# Patient Record
Sex: MALE | Race: OTHER | HISPANIC OR LATINO | URBAN - METROPOLITAN AREA
[De-identification: names, ages, dates, MRNs, and addresses within clinical notes are randomized per-mention and may not be internally consistent; named-entity substitution may affect disease eponyms.]

---

## 2019-07-11 ENCOUNTER — INPATIENT (INPATIENT)
Facility: HOSPITAL | Age: 34
LOS: 11 days | Discharge: HOME HEALTH | DRG: 329 | End: 2019-07-23
Attending: SPECIALIST | Admitting: SPECIALIST
Payer: MEDICAID

## 2019-07-11 ENCOUNTER — TRANSCRIPTION ENCOUNTER (OUTPATIENT)
Age: 34
End: 2019-07-11

## 2019-07-11 VITALS
HEART RATE: 71 BPM | DIASTOLIC BLOOD PRESSURE: 78 MMHG | TEMPERATURE: 98 F | HEIGHT: 65 IN | WEIGHT: 179.9 LBS | RESPIRATION RATE: 16 BRPM | OXYGEN SATURATION: 98 % | SYSTOLIC BLOOD PRESSURE: 114 MMHG

## 2019-07-11 LAB
ALBUMIN SERPL ELPH-MCNC: 4.1 G/DL — SIGNIFICANT CHANGE UP (ref 3.5–5)
ALP SERPL-CCNC: 86 U/L — SIGNIFICANT CHANGE UP (ref 40–120)
ALT FLD-CCNC: 29 U/L DA — SIGNIFICANT CHANGE UP (ref 10–60)
ANION GAP SERPL CALC-SCNC: 9 MMOL/L — SIGNIFICANT CHANGE UP (ref 5–17)
AST SERPL-CCNC: 18 U/L — SIGNIFICANT CHANGE UP (ref 10–40)
BASOPHILS # BLD AUTO: 0.04 K/UL — SIGNIFICANT CHANGE UP (ref 0–0.2)
BASOPHILS NFR BLD AUTO: 0.4 % — SIGNIFICANT CHANGE UP (ref 0–2)
BILIRUB SERPL-MCNC: 1.1 MG/DL — SIGNIFICANT CHANGE UP (ref 0.2–1.2)
BUN SERPL-MCNC: 8 MG/DL — SIGNIFICANT CHANGE UP (ref 7–18)
CALCIUM SERPL-MCNC: 8.4 MG/DL — SIGNIFICANT CHANGE UP (ref 8.4–10.5)
CHLORIDE SERPL-SCNC: 107 MMOL/L — SIGNIFICANT CHANGE UP (ref 96–108)
CO2 SERPL-SCNC: 25 MMOL/L — SIGNIFICANT CHANGE UP (ref 22–31)
CREAT SERPL-MCNC: 1.05 MG/DL — SIGNIFICANT CHANGE UP (ref 0.5–1.3)
EOSINOPHIL # BLD AUTO: 0.03 K/UL — SIGNIFICANT CHANGE UP (ref 0–0.5)
EOSINOPHIL NFR BLD AUTO: 0.3 % — SIGNIFICANT CHANGE UP (ref 0–6)
ETHANOL SERPL-MCNC: <3 MG/DL — SIGNIFICANT CHANGE UP (ref 0–10)
GLUCOSE SERPL-MCNC: 128 MG/DL — HIGH (ref 70–99)
HCT VFR BLD CALC: 50.1 % — HIGH (ref 39–50)
HGB BLD-MCNC: 17 G/DL — SIGNIFICANT CHANGE UP (ref 13–17)
IMM GRANULOCYTES NFR BLD AUTO: 0.3 % — SIGNIFICANT CHANGE UP (ref 0–1.5)
LIDOCAIN IGE QN: 39 U/L — LOW (ref 73–393)
LYMPHOCYTES # BLD AUTO: 1.68 K/UL — SIGNIFICANT CHANGE UP (ref 1–3.3)
LYMPHOCYTES # BLD AUTO: 14.9 % — SIGNIFICANT CHANGE UP (ref 13–44)
MCHC RBC-ENTMCNC: 33.1 PG — SIGNIFICANT CHANGE UP (ref 27–34)
MCHC RBC-ENTMCNC: 33.9 GM/DL — SIGNIFICANT CHANGE UP (ref 32–36)
MCV RBC AUTO: 97.5 FL — SIGNIFICANT CHANGE UP (ref 80–100)
MONOCYTES # BLD AUTO: 0.93 K/UL — HIGH (ref 0–0.9)
MONOCYTES NFR BLD AUTO: 8.3 % — SIGNIFICANT CHANGE UP (ref 2–14)
NEUTROPHILS # BLD AUTO: 8.54 K/UL — HIGH (ref 1.8–7.4)
NEUTROPHILS NFR BLD AUTO: 75.8 % — SIGNIFICANT CHANGE UP (ref 43–77)
NRBC # BLD: 0 /100 WBCS — SIGNIFICANT CHANGE UP (ref 0–0)
PLATELET # BLD AUTO: 289 K/UL — SIGNIFICANT CHANGE UP (ref 150–400)
POTASSIUM SERPL-MCNC: 3.2 MMOL/L — LOW (ref 3.5–5.3)
POTASSIUM SERPL-SCNC: 3.2 MMOL/L — LOW (ref 3.5–5.3)
PROT SERPL-MCNC: 8.4 G/DL — HIGH (ref 6–8.3)
RBC # BLD: 5.14 M/UL — SIGNIFICANT CHANGE UP (ref 4.2–5.8)
RBC # FLD: 11.9 % — SIGNIFICANT CHANGE UP (ref 10.3–14.5)
SODIUM SERPL-SCNC: 141 MMOL/L — SIGNIFICANT CHANGE UP (ref 135–145)
WBC # BLD: 11.25 K/UL — HIGH (ref 3.8–10.5)
WBC # FLD AUTO: 11.25 K/UL — HIGH (ref 3.8–10.5)

## 2019-07-11 PROCEDURE — 74177 CT ABD & PELVIS W/CONTRAST: CPT | Mod: 26

## 2019-07-11 PROCEDURE — 93010 ELECTROCARDIOGRAM REPORT: CPT

## 2019-07-11 RX ORDER — MORPHINE SULFATE 50 MG/1
4 CAPSULE, EXTENDED RELEASE ORAL ONCE
Refills: 0 | Status: DISCONTINUED | OUTPATIENT
Start: 2019-07-11 | End: 2019-07-11

## 2019-07-11 RX ORDER — POTASSIUM CHLORIDE 20 MEQ
40 PACKET (EA) ORAL ONCE
Refills: 0 | Status: COMPLETED | OUTPATIENT
Start: 2019-07-11 | End: 2019-07-11

## 2019-07-11 RX ORDER — SODIUM CHLORIDE 9 MG/ML
1000 INJECTION INTRAMUSCULAR; INTRAVENOUS; SUBCUTANEOUS ONCE
Refills: 0 | Status: COMPLETED | OUTPATIENT
Start: 2019-07-11 | End: 2019-07-11

## 2019-07-11 RX ORDER — ONDANSETRON 8 MG/1
4 TABLET, FILM COATED ORAL ONCE
Refills: 0 | Status: COMPLETED | OUTPATIENT
Start: 2019-07-11 | End: 2019-07-11

## 2019-07-11 RX ORDER — FAMOTIDINE 10 MG/ML
20 INJECTION INTRAVENOUS ONCE
Refills: 0 | Status: COMPLETED | OUTPATIENT
Start: 2019-07-11 | End: 2019-07-11

## 2019-07-11 RX ADMIN — SODIUM CHLORIDE 1000 MILLILITER(S): 9 INJECTION INTRAMUSCULAR; INTRAVENOUS; SUBCUTANEOUS at 23:00

## 2019-07-11 RX ADMIN — SODIUM CHLORIDE 1000 MILLILITER(S): 9 INJECTION INTRAMUSCULAR; INTRAVENOUS; SUBCUTANEOUS at 21:59

## 2019-07-11 RX ADMIN — FAMOTIDINE 20 MILLIGRAM(S): 10 INJECTION INTRAVENOUS at 22:00

## 2019-07-11 RX ADMIN — MORPHINE SULFATE 4 MILLIGRAM(S): 50 CAPSULE, EXTENDED RELEASE ORAL at 23:48

## 2019-07-11 RX ADMIN — MORPHINE SULFATE 4 MILLIGRAM(S): 50 CAPSULE, EXTENDED RELEASE ORAL at 21:59

## 2019-07-11 RX ADMIN — ONDANSETRON 4 MILLIGRAM(S): 8 TABLET, FILM COATED ORAL at 22:00

## 2019-07-11 NOTE — ED PROVIDER NOTE - PROGRESS NOTE DETAILS
(White) - s/o to f/u CT and reassess  CT - diverticulitis with perforation and phlegmon vs developing abscess  Surgery consulted, endorsed to house officer Dr Red, admitted to Dr Pierce.

## 2019-07-11 NOTE — ED PROVIDER NOTE - OBJECTIVE STATEMENT
35 y/o male with no significant PMHx and no significant PSHx presents to ED for abdominal pain which began today. Patient denies any nausea, vomiting, urinary problems, or any other complaints. BLANCA

## 2019-07-11 NOTE — ED PROVIDER NOTE - CLINICAL SUMMARY MEDICAL DECISION MAKING FREE TEXT BOX
Patient is 35 y/o male presenting to the ED for abdominal pain which started today. Will obtains labs, CAT scan to rule out appendicitis and will reassess.

## 2019-07-11 NOTE — ED ADULT NURSE NOTE - ED STAT RN HANDOFF DETAILS
Report given to RANJEET Lorenzana from 6S. Pt is ANO x4. not in any acute distress. Nurse aware pt is pending urine collection

## 2019-07-12 ENCOUNTER — RESULT REVIEW (OUTPATIENT)
Age: 34
End: 2019-07-12

## 2019-07-12 DIAGNOSIS — K57.92 DIVERTICULITIS OF INTESTINE, PART UNSPECIFIED, WITHOUT PERFORATION OR ABSCESS WITHOUT BLEEDING: ICD-10-CM

## 2019-07-12 LAB
ABO RH CONFIRMATION: SIGNIFICANT CHANGE UP
ANION GAP SERPL CALC-SCNC: 6 MMOL/L — SIGNIFICANT CHANGE UP (ref 5–17)
ANISOCYTOSIS BLD QL: SLIGHT — SIGNIFICANT CHANGE UP
APPEARANCE UR: CLEAR — SIGNIFICANT CHANGE UP
BASOPHILS # BLD AUTO: 0 K/UL — SIGNIFICANT CHANGE UP (ref 0–0.2)
BASOPHILS NFR BLD AUTO: 0 % — SIGNIFICANT CHANGE UP (ref 0–2)
BILIRUB UR-MCNC: NEGATIVE — SIGNIFICANT CHANGE UP
BLD GP AB SCN SERPL QL: SIGNIFICANT CHANGE UP
BUN SERPL-MCNC: 9 MG/DL — SIGNIFICANT CHANGE UP (ref 7–18)
CALCIUM SERPL-MCNC: 8.2 MG/DL — LOW (ref 8.4–10.5)
CHLORIDE SERPL-SCNC: 108 MMOL/L — SIGNIFICANT CHANGE UP (ref 96–108)
CO2 SERPL-SCNC: 25 MMOL/L — SIGNIFICANT CHANGE UP (ref 22–31)
COLOR SPEC: YELLOW — SIGNIFICANT CHANGE UP
CREAT SERPL-MCNC: 1.12 MG/DL — SIGNIFICANT CHANGE UP (ref 0.5–1.3)
DIFF PNL FLD: ABNORMAL
EOSINOPHIL # BLD AUTO: 0 K/UL — SIGNIFICANT CHANGE UP (ref 0–0.5)
EOSINOPHIL NFR BLD AUTO: 0 % — SIGNIFICANT CHANGE UP (ref 0–6)
GLUCOSE BLDC GLUCOMTR-MCNC: 111 MG/DL — HIGH (ref 70–99)
GLUCOSE BLDC GLUCOMTR-MCNC: 96 MG/DL — SIGNIFICANT CHANGE UP (ref 70–99)
GLUCOSE SERPL-MCNC: 121 MG/DL — HIGH (ref 70–99)
GLUCOSE UR QL: NEGATIVE — SIGNIFICANT CHANGE UP
HCT VFR BLD CALC: 46.4 % — SIGNIFICANT CHANGE UP (ref 39–50)
HGB BLD-MCNC: 15.5 G/DL — SIGNIFICANT CHANGE UP (ref 13–17)
KETONES UR-MCNC: NEGATIVE — SIGNIFICANT CHANGE UP
LEUKOCYTE ESTERASE UR-ACNC: ABNORMAL
LYMPHOCYTES # BLD AUTO: 1.55 K/UL — SIGNIFICANT CHANGE UP (ref 1–3.3)
LYMPHOCYTES # BLD AUTO: 10 % — LOW (ref 13–44)
MANUAL SMEAR VERIFICATION: SIGNIFICANT CHANGE UP
MCHC RBC-ENTMCNC: 32.4 PG — SIGNIFICANT CHANGE UP (ref 27–34)
MCHC RBC-ENTMCNC: 33.4 GM/DL — SIGNIFICANT CHANGE UP (ref 32–36)
MCV RBC AUTO: 97.1 FL — SIGNIFICANT CHANGE UP (ref 80–100)
MICROCYTES BLD QL: SLIGHT — SIGNIFICANT CHANGE UP
MONOCYTES # BLD AUTO: 0.46 K/UL — SIGNIFICANT CHANGE UP (ref 0–0.9)
MONOCYTES NFR BLD AUTO: 3 % — SIGNIFICANT CHANGE UP (ref 2–14)
NEUTROPHILS # BLD AUTO: 13.47 K/UL — HIGH (ref 1.8–7.4)
NEUTROPHILS NFR BLD AUTO: 87 % — HIGH (ref 43–77)
NITRITE UR-MCNC: NEGATIVE — SIGNIFICANT CHANGE UP
NRBC # BLD: 0 /100 — SIGNIFICANT CHANGE UP (ref 0–0)
PH UR: 5 — SIGNIFICANT CHANGE UP (ref 5–8)
PLAT MORPH BLD: NORMAL — SIGNIFICANT CHANGE UP
PLATELET # BLD AUTO: 247 K/UL — SIGNIFICANT CHANGE UP (ref 150–400)
POIKILOCYTOSIS BLD QL AUTO: SLIGHT — SIGNIFICANT CHANGE UP
POTASSIUM SERPL-MCNC: 3.7 MMOL/L — SIGNIFICANT CHANGE UP (ref 3.5–5.3)
POTASSIUM SERPL-SCNC: 3.7 MMOL/L — SIGNIFICANT CHANGE UP (ref 3.5–5.3)
PROT UR-MCNC: 30 MG/DL
RBC # BLD: 4.78 M/UL — SIGNIFICANT CHANGE UP (ref 4.2–5.8)
RBC # FLD: 12 % — SIGNIFICANT CHANGE UP (ref 10.3–14.5)
RBC BLD AUTO: ABNORMAL
SODIUM SERPL-SCNC: 139 MMOL/L — SIGNIFICANT CHANGE UP (ref 135–145)
SP GR SPEC: 1.01 — SIGNIFICANT CHANGE UP (ref 1.01–1.02)
UROBILINOGEN FLD QL: 1
WBC # BLD: 15.48 K/UL — HIGH (ref 3.8–10.5)
WBC # FLD AUTO: 15.48 K/UL — HIGH (ref 3.8–10.5)

## 2019-07-12 PROCEDURE — 44143 PARTIAL REMOVAL OF COLON: CPT

## 2019-07-12 PROCEDURE — 44143 PARTIAL REMOVAL OF COLON: CPT | Mod: AS

## 2019-07-12 PROCEDURE — 99223 1ST HOSP IP/OBS HIGH 75: CPT | Mod: 57

## 2019-07-12 PROCEDURE — 99285 EMERGENCY DEPT VISIT HI MDM: CPT

## 2019-07-12 PROCEDURE — 88307 TISSUE EXAM BY PATHOLOGIST: CPT | Mod: 26

## 2019-07-12 RX ORDER — ACETAMINOPHEN 500 MG
650 TABLET ORAL EVERY 6 HOURS
Refills: 0 | Status: DISCONTINUED | OUTPATIENT
Start: 2019-07-12 | End: 2019-07-20

## 2019-07-12 RX ORDER — IPRATROPIUM/ALBUTEROL SULFATE 18-103MCG
1 AEROSOL WITH ADAPTER (GRAM) INHALATION
Refills: 0 | Status: DISCONTINUED | OUTPATIENT
Start: 2019-07-12 | End: 2019-07-12

## 2019-07-12 RX ORDER — FAMOTIDINE 10 MG/ML
20 INJECTION INTRAVENOUS
Refills: 0 | Status: DISCONTINUED | OUTPATIENT
Start: 2019-07-12 | End: 2019-07-20

## 2019-07-12 RX ORDER — MORPHINE SULFATE 50 MG/1
4 CAPSULE, EXTENDED RELEASE ORAL ONCE
Refills: 0 | Status: DISCONTINUED | OUTPATIENT
Start: 2019-07-12 | End: 2019-07-12

## 2019-07-12 RX ORDER — ONDANSETRON 8 MG/1
4 TABLET, FILM COATED ORAL ONCE
Refills: 0 | Status: DISCONTINUED | OUTPATIENT
Start: 2019-07-12 | End: 2019-07-12

## 2019-07-12 RX ORDER — ONDANSETRON 8 MG/1
4 TABLET, FILM COATED ORAL EVERY 6 HOURS
Refills: 0 | Status: DISCONTINUED | OUTPATIENT
Start: 2019-07-12 | End: 2019-07-23

## 2019-07-12 RX ORDER — MORPHINE SULFATE 50 MG/1
4 CAPSULE, EXTENDED RELEASE ORAL EVERY 4 HOURS
Refills: 0 | Status: DISCONTINUED | OUTPATIENT
Start: 2019-07-12 | End: 2019-07-16

## 2019-07-12 RX ORDER — FENTANYL CITRATE 50 UG/ML
25 INJECTION INTRAVENOUS
Refills: 0 | Status: DISCONTINUED | OUTPATIENT
Start: 2019-07-12 | End: 2019-07-12

## 2019-07-12 RX ORDER — HEPARIN SODIUM 5000 [USP'U]/ML
5000 INJECTION INTRAVENOUS; SUBCUTANEOUS EVERY 8 HOURS
Refills: 0 | Status: DISCONTINUED | OUTPATIENT
Start: 2019-07-12 | End: 2019-07-23

## 2019-07-12 RX ORDER — ACETAMINOPHEN 500 MG
650 TABLET ORAL EVERY 6 HOURS
Refills: 0 | Status: COMPLETED | OUTPATIENT
Start: 2019-07-12 | End: 2019-07-12

## 2019-07-12 RX ORDER — HEPARIN SODIUM 5000 [USP'U]/ML
5000 INJECTION INTRAVENOUS; SUBCUTANEOUS EVERY 12 HOURS
Refills: 0 | Status: DISCONTINUED | OUTPATIENT
Start: 2019-07-12 | End: 2019-07-12

## 2019-07-12 RX ORDER — HYDROMORPHONE HYDROCHLORIDE 2 MG/ML
1 INJECTION INTRAMUSCULAR; INTRAVENOUS; SUBCUTANEOUS EVERY 4 HOURS
Refills: 0 | Status: DISCONTINUED | OUTPATIENT
Start: 2019-07-12 | End: 2019-07-16

## 2019-07-12 RX ORDER — HYDROMORPHONE HYDROCHLORIDE 2 MG/ML
0.5 INJECTION INTRAMUSCULAR; INTRAVENOUS; SUBCUTANEOUS
Refills: 0 | Status: DISCONTINUED | OUTPATIENT
Start: 2019-07-12 | End: 2019-07-12

## 2019-07-12 RX ORDER — SODIUM CHLORIDE 9 MG/ML
1000 INJECTION, SOLUTION INTRAVENOUS
Refills: 0 | Status: DISCONTINUED | OUTPATIENT
Start: 2019-07-12 | End: 2019-07-15

## 2019-07-12 RX ORDER — CHLORHEXIDINE GLUCONATE 213 G/1000ML
1 SOLUTION TOPICAL ONCE
Refills: 0 | Status: COMPLETED | OUTPATIENT
Start: 2019-07-12 | End: 2019-07-12

## 2019-07-12 RX ORDER — DEXTROSE MONOHYDRATE, SODIUM CHLORIDE, AND POTASSIUM CHLORIDE 50; .745; 4.5 G/1000ML; G/1000ML; G/1000ML
1000 INJECTION, SOLUTION INTRAVENOUS
Refills: 0 | Status: DISCONTINUED | OUTPATIENT
Start: 2019-07-12 | End: 2019-07-12

## 2019-07-12 RX ORDER — ACETAMINOPHEN 500 MG
1000 TABLET ORAL ONCE
Refills: 0 | Status: DISCONTINUED | OUTPATIENT
Start: 2019-07-12 | End: 2019-07-12

## 2019-07-12 RX ORDER — PIPERACILLIN AND TAZOBACTAM 4; .5 G/20ML; G/20ML
3.38 INJECTION, POWDER, LYOPHILIZED, FOR SOLUTION INTRAVENOUS EVERY 8 HOURS
Refills: 0 | Status: COMPLETED | OUTPATIENT
Start: 2019-07-12 | End: 2019-07-18

## 2019-07-12 RX ORDER — IPRATROPIUM BROMIDE 0.2 MG/ML
1 SOLUTION, NON-ORAL INHALATION
Refills: 0 | Status: DISCONTINUED | OUTPATIENT
Start: 2019-07-12 | End: 2019-07-23

## 2019-07-12 RX ORDER — PIPERACILLIN AND TAZOBACTAM 4; .5 G/20ML; G/20ML
3.38 INJECTION, POWDER, LYOPHILIZED, FOR SOLUTION INTRAVENOUS ONCE
Refills: 0 | Status: COMPLETED | OUTPATIENT
Start: 2019-07-12 | End: 2019-07-12

## 2019-07-12 RX ORDER — ALBUTEROL 90 UG/1
1 AEROSOL, METERED ORAL
Refills: 0 | Status: DISCONTINUED | OUTPATIENT
Start: 2019-07-12 | End: 2019-07-23

## 2019-07-12 RX ADMIN — FAMOTIDINE 20 MILLIGRAM(S): 10 INJECTION INTRAVENOUS at 05:20

## 2019-07-12 RX ADMIN — HYDROMORPHONE HYDROCHLORIDE 1 MILLIGRAM(S): 2 INJECTION INTRAMUSCULAR; INTRAVENOUS; SUBCUTANEOUS at 23:51

## 2019-07-12 RX ADMIN — Medication 40 MILLIEQUIVALENT(S): at 00:42

## 2019-07-12 RX ADMIN — FAMOTIDINE 20 MILLIGRAM(S): 10 INJECTION INTRAVENOUS at 18:17

## 2019-07-12 RX ADMIN — PIPERACILLIN AND TAZOBACTAM 25 GRAM(S): 4; .5 INJECTION, POWDER, LYOPHILIZED, FOR SOLUTION INTRAVENOUS at 18:08

## 2019-07-12 RX ADMIN — HYDROMORPHONE HYDROCHLORIDE 1 MILLIGRAM(S): 2 INJECTION INTRAMUSCULAR; INTRAVENOUS; SUBCUTANEOUS at 19:52

## 2019-07-12 RX ADMIN — HYDROMORPHONE HYDROCHLORIDE 1 MILLIGRAM(S): 2 INJECTION INTRAMUSCULAR; INTRAVENOUS; SUBCUTANEOUS at 05:20

## 2019-07-12 RX ADMIN — MORPHINE SULFATE 4 MILLIGRAM(S): 50 CAPSULE, EXTENDED RELEASE ORAL at 04:09

## 2019-07-12 RX ADMIN — Medication 650 MILLIGRAM(S): at 05:20

## 2019-07-12 RX ADMIN — MORPHINE SULFATE 4 MILLIGRAM(S): 50 CAPSULE, EXTENDED RELEASE ORAL at 03:09

## 2019-07-12 RX ADMIN — Medication 650 MILLIGRAM(S): at 05:50

## 2019-07-12 RX ADMIN — PIPERACILLIN AND TAZOBACTAM 200 GRAM(S): 4; .5 INJECTION, POWDER, LYOPHILIZED, FOR SOLUTION INTRAVENOUS at 03:08

## 2019-07-12 RX ADMIN — HYDROMORPHONE HYDROCHLORIDE 1 MILLIGRAM(S): 2 INJECTION INTRAMUSCULAR; INTRAVENOUS; SUBCUTANEOUS at 20:07

## 2019-07-12 RX ADMIN — HYDROMORPHONE HYDROCHLORIDE 1 MILLIGRAM(S): 2 INJECTION INTRAMUSCULAR; INTRAVENOUS; SUBCUTANEOUS at 05:50

## 2019-07-12 RX ADMIN — SODIUM CHLORIDE 150 MILLILITER(S): 9 INJECTION, SOLUTION INTRAVENOUS at 18:12

## 2019-07-12 NOTE — H&P ADULT - NSHPPHYSICALEXAM_GEN_ALL_CORE
Vital Signs Last 24 Hrs  T(C): 38.7 (12 Jul 2019 05:00), Max: 38.7 (12 Jul 2019 05:00)  T(F): 101.6 (12 Jul 2019 05:00), Max: 101.6 (12 Jul 2019 05:00)  HR: 84 (12 Jul 2019 05:00) (71 - 84)  BP: 109/76 (12 Jul 2019 05:00) (109/76 - 120/67)  BP(mean): --  RR: 18 (12 Jul 2019 05:00) (16 - 18)  SpO2: 97% (12 Jul 2019 05:00) (96% - 98%)

## 2019-07-12 NOTE — CONSULT NOTE ADULT - SUBJECTIVE AND OBJECTIVE BOX
HPI:  35 y/o man with PMH of asthma c/o abdominal pain for 1 x day. Pain sharp, diffuse, but more intense in LLQ/RLQ and suprapubic areas. Pt also felt nauseous and had 2-3 episodes of vomiting. Pt also c/o fever and urinary retention. Pt denies diarrhea, dysuria, fecaluria, CP, SOB, wheezing, dizziness or hx of similar symptoms in past.  WBC 11  CT abd/pel: complicated sigmoid diverticulitis with microperforation, phlegmon/abscess, loculated fluid in pelvis and questionable colovesical fistula (2019 04:06)      PAST MEDICAL & SURGICAL HISTORY:  No pertinent past medical history  Asthma      No Known Drug Allergies  shellfish (Unknown)      Meds:  acetaminophen  Suppository .. 650 milliGRAM(s) Rectal every 6 hours PRN  ALBUTerol    90 MICROgram(s) HFA Inhaler 1 Puff(s) Inhalation four times a day PRN  chlorhexidine 4% Liquid 1 Application(s) Topical once  famotidine Injectable 20 milliGRAM(s) IV Push two times a day  heparin  Injectable 5000 Unit(s) SubCutaneous every 8 hours  HYDROmorphone  Injectable 1 milliGRAM(s) IV Push every 4 hours PRN  ipratropium 17 MICROgram(s) HFA Inhaler 1 Puff(s) Inhalation four times a day PRN  lactated ringers. 1000 milliLiter(s) IV Continuous <Continuous>  morphine  - Injectable 4 milliGRAM(s) IV Push every 4 hours PRN  ondansetron Injectable 4 milliGRAM(s) IV Push every 6 hours PRN  piperacillin/tazobactam IVPB.. 3.375 Gram(s) IV Intermittent every 8 hours      SOCIAL HISTORY:  Smoker:  YES / NO        PACK YEARS:                         WHEN QUIT?  ETOH use:  YES / NO               FREQUENCY / QUANTITY:  Ilicit Drug use:  YES / NO  Occupation:  Assisted device use (Cane / Walker):  Live with:    FAMILY HISTORY:      VITALS:  Vital Signs Last 24 Hrs  T(C): 37.5 (2019 15:16), Max: 38.7 (2019 05:00)  T(F): 99.5 (2019 15:16), Max: 101.6 (2019 05:00)  HR: 81 (2019 15:16) (71 - 113)  BP: 121/69 (2019 15:16) (107/56 - 126/76)  BP(mean): 87 (2019 14:30) (74 - 89)  RR: 16 (2019 15:16) (16 - 22)  SpO2: 95% (2019 15:16) (94% - 100%)    LABS/DIAGNOSTIC TESTS:                          15.5   15.48 )-----------( 247      ( 2019 06:11 )             46.4     WBC Count: 15.48 K/uL ( @ 06:11)  WBC Count: 11.25 K/uL ( @ 21:59)          139  |  108  |  9   ----------------------------<  121<H>  3.7   |  25  |  1.12    Ca    8.2<L>      2019 06:11    TPro  8.4<H>  /  Alb  4.1  /  TBili  1.1  /  DBili  x   /  AST  18  /  ALT  29  /  AlkPhos  86  07-11      Urinalysis Basic - ( 2019 07:18 )    Color: Yellow / Appearance: Clear / S.010 / pH: x  Gluc: x / Ketone: Negative  / Bili: Negative / Urobili: 1   Blood: x / Protein: 30 mg/dL / Nitrite: Negative   Leuk Esterase: Small / RBC: 5-10 /HPF / WBC 6-10 /HPF   Sq Epi: x / Non Sq Epi: Few /HPF / Bacteria: Moderate /HPF        LIVER FUNCTIONS - ( 2019 21:59 )  Alb: 4.1 g/dL / Pro: 8.4 g/dL / ALK PHOS: 86 U/L / ALT: 29 U/L DA / AST: 18 U/L / GGT: x                 LACTATE:    ABG -     CULTURES:       RADIOLOGY:< from: CT Abdomen and Pelvis w/ IV Cont (19 @ 23:58) >  EXAM:  CT ABDOMEN AND PELVIS IC                            PROCEDURE DATE:  2019          INTERPRETATION:  CT ABDOMEN AND PELVIS WITH CONTRAST    INDICATION: Right lower quadrant abdominal pain.    TECHNIQUE: Contrast enhanced CT of the abdomen and pelvis. Images are   reformatted in the sagittal and coronal planes.    90 mL of Omnipaque 350 contrast material was injected IV.    COMPARISON: None.    FINDINGS:    Lower Thorax: No consolidation or effusion.    Liver: No suspicious lesions.  Biliary: No dilatation. No calcified gallstones within the gallbladder.  Spleen: No suspicious lesions.  Pancreas: No inflammatory changes or ductal dilatation.  Adrenals: Normal.  Kidneys: Symmetrically enhancing without hydronephrosis.  Vessels: Normal caliber.    GI tract: Diverticulosis. Diffuse circumferential wall thickening of   distal descending as well as sigmoid colonic loops with extensive   pericolic stranding and fluid extending to the pelvis, concerning for   acute diverticulitis. Consider nonemergent colonoscopy evaluation once   inflammation subsides to exclude underlying neoplasm. There are punctate   bubbles of air adjacent to the inflamed sigmoid colonic loops, compatible   with perforation. Multiloculated fluid collectionssurrounding the   inflamed sigmoid colonic loops, concerning for phlegmon or developing   abscesses measuring 5.2 x 2.3 cm in left lower quadrant. No evidence of   small bowel obstruction. Normal appendix.    Peritoneum/retroperitoneum and mesentery:Mild pneumoperitoneum,   compatible with perforated diverticulitis. Mild ascites. Multiloculated   fluid collections surrounding the inflamed sigmoid colonic loops,   concerning for phlegmon or developing abscesses measuring 5.2 x 2.3 cm in   left lower quadrant. Additional loculated fluid collection in the pelvis   with somewhat rim enhancement measuring up to 6.3 cm as best seen on   image 65 of series 602. No adenopathy.    Pelvic organs/Bladder: Irregular appearance of bladder with thickened   wall, abutting inflamed sigmoid diverticulitis, possibility of   colovesicular fistula considered. Normal prostate.    Abdominal wall: Unremarkable.  Bones and soft tissues: Mild multilevel degenerative changes of the spine   are noted. Mild anterolisthesis of L4 on L5, likely due to bilateral   facet spondylolysis.    IMPRESSION:    Findings concerning for perforated diverticulitis involving distal   descending and sigmoid colonic loops. Consider colonoscopy evaluation   once inflammation subsides to exclude underlying neoplasm. Multiloculated   fluid collections surrounding the inflamed sigmoid colonic loops,   concerning for phlegmon or developing abscesses measuring 5.2 x 2.3 cm in   left lower quadrant. Additional loculated fluid collection in the pelvis   with somewhat rim enhancement measuring up to 6.3 cm.     Irregular appearance of bladder with thickened wall, abutting inflamed   sigmoid diverticulitis, possibility of colovesicular fistula considered.     Normal appendix.     These critical results were discussed via telephone at 2019 1:07 AM   by Dr. Ordaz of radiology with Dr. White, institution read-back   verification policy was followed.                 FERN ORDAZ M.D., ATTENDING RADIOLOGIST  This document has been electronically signed. 2019  1:14AM    < end of copied text >        ROS  [  ] UNABLE TO ELICIT HPI:  35 y/o man with PMH of asthma c/o abdominal pain for 1 x day. Pain sharp, diffuse, but more intense in LLQ/RLQ and suprapubic areas. Pt also felt nauseous and had 2-3 episodes of vomiting. Pt also c/o fever and urinary retention. Pt denies diarrhea, dysuria, fecaluria, CP, SOB, wheezing, dizziness or hx of similar symptoms in past.  WBC 11  CT abd/pel: complicated sigmoid diverticulitis with microperforation, phlegmon/abscess, loculated fluid in pelvis and questionable colovesical fistula (2019 04:06)    History as above, the patient is s/p Francisca procedure for perforated diverticulitis and intraabdominal abscesses/peritonitis, he had a washout of his abdomen today. Currently he c/o abdominal pain and feeling thirsty. He was found to have a temp of 101.6 and an elevated wbc count here also.      PAST MEDICAL & SURGICAL HISTORY:  No pertinent past medical history  Asthma      No Known Drug Allergies  shellfish (Unknown)      Meds:  acetaminophen  Suppository .. 650 milliGRAM(s) Rectal every 6 hours PRN  ALBUTerol    90 MICROgram(s) HFA Inhaler 1 Puff(s) Inhalation four times a day PRN  chlorhexidine 4% Liquid 1 Application(s) Topical once  famotidine Injectable 20 milliGRAM(s) IV Push two times a day  heparin  Injectable 5000 Unit(s) SubCutaneous every 8 hours  HYDROmorphone  Injectable 1 milliGRAM(s) IV Push every 4 hours PRN  ipratropium 17 MICROgram(s) HFA Inhaler 1 Puff(s) Inhalation four times a day PRN  lactated ringers. 1000 milliLiter(s) IV Continuous <Continuous>  morphine  - Injectable 4 milliGRAM(s) IV Push every 4 hours PRN  ondansetron Injectable 4 milliGRAM(s) IV Push every 6 hours PRN  piperacillin/tazobactam IVPB.. 3.375 Gram(s) IV Intermittent every 8 hours      SOCIAL HISTORY:  Smoker: no  ETOH use: no  Ilicit Drug use: no      FAMILY HISTORY:      VITALS:  Vital Signs Last 24 Hrs  T(C): 37.5 (2019 15:16), Max: 38.7 (2019 05:00)  T(F): 99.5 (2019 15:16), Max: 101.6 (2019 05:00)  HR: 81 (2019 15:16) (71 - 113)  BP: 121/69 (2019 15:16) (107/56 - 126/76)  BP(mean): 87 (2019 14:30) (74 - 89)  RR: 16 (2019 15:16) (16 - 22)  SpO2: 95% (2019 15:16) (94% - 100%)    LABS/DIAGNOSTIC TESTS:                          15.5   15.48 )-----------( 247      ( 2019 06:11 )             46.4     WBC Count: 15.48 K/uL ( @ 06:11)  WBC Count: 11.25 K/uL ( @ 21:59)          139  |  108  |  9   ----------------------------<  121<H>  3.7   |  25  |  1.12    Ca    8.2<L>      2019 06:11    TPro  8.4<H>  /  Alb  4.1  /  TBili  1.1  /  DBili  x   /  AST  18  /  ALT  29  /  AlkPhos  86  07-11      Urinalysis Basic - ( 2019 07:18 )    Color: Yellow / Appearance: Clear / S.010 / pH: x  Gluc: x / Ketone: Negative  / Bili: Negative / Urobili: 1   Blood: x / Protein: 30 mg/dL / Nitrite: Negative   Leuk Esterase: Small / RBC: 5-10 /HPF / WBC 6-10 /HPF   Sq Epi: x / Non Sq Epi: Few /HPF / Bacteria: Moderate /HPF        LIVER FUNCTIONS - ( 2019 21:59 )  Alb: 4.1 g/dL / Pro: 8.4 g/dL / ALK PHOS: 86 U/L / ALT: 29 U/L DA / AST: 18 U/L / GGT: x                 LACTATE:    ABG -     CULTURES:       RADIOLOGY:< from: CT Abdomen and Pelvis w/ IV Cont (19 @ 23:58) >  EXAM:  CT ABDOMEN AND PELVIS IC                            PROCEDURE DATE:  2019          INTERPRETATION:  CT ABDOMEN AND PELVIS WITH CONTRAST    INDICATION: Right lower quadrant abdominal pain.    TECHNIQUE: Contrast enhanced CT of the abdomen and pelvis. Images are   reformatted in the sagittal and coronal planes.    90 mL of Omnipaque 350 contrast material was injected IV.    COMPARISON: None.    FINDINGS:    Lower Thorax: No consolidation or effusion.    Liver: No suspicious lesions.  Biliary: No dilatation. No calcified gallstones within the gallbladder.  Spleen: No suspicious lesions.  Pancreas: No inflammatory changes or ductal dilatation.  Adrenals: Normal.  Kidneys: Symmetrically enhancing without hydronephrosis.  Vessels: Normal caliber.    GI tract: Diverticulosis. Diffuse circumferential wall thickening of   distal descending as well as sigmoid colonic loops with extensive   pericolic stranding and fluid extending to the pelvis, concerning for   acute diverticulitis. Consider nonemergent colonoscopy evaluation once   inflammation subsides to exclude underlying neoplasm. There are punctate   bubbles of air adjacent to the inflamed sigmoid colonic loops, compatible   with perforation. Multiloculated fluid collectionssurrounding the   inflamed sigmoid colonic loops, concerning for phlegmon or developing   abscesses measuring 5.2 x 2.3 cm in left lower quadrant. No evidence of   small bowel obstruction. Normal appendix.    Peritoneum/retroperitoneum and mesentery:Mild pneumoperitoneum,   compatible with perforated diverticulitis. Mild ascites. Multiloculated   fluid collections surrounding the inflamed sigmoid colonic loops,   concerning for phlegmon or developing abscesses measuring 5.2 x 2.3 cm in   left lower quadrant. Additional loculated fluid collection in the pelvis   with somewhat rim enhancement measuring up to 6.3 cm as best seen on   image 65 of series 602. No adenopathy.    Pelvic organs/Bladder: Irregular appearance of bladder with thickened   wall, abutting inflamed sigmoid diverticulitis, possibility of   colovesicular fistula considered. Normal prostate.    Abdominal wall: Unremarkable.  Bones and soft tissues: Mild multilevel degenerative changes of the spine   are noted. Mild anterolisthesis of L4 on L5, likely due to bilateral   facet spondylolysis.    IMPRESSION:    Findings concerning for perforated diverticulitis involving distal   descending and sigmoid colonic loops. Consider colonoscopy evaluation   once inflammation subsides to exclude underlying neoplasm. Multiloculated   fluid collections surrounding the inflamed sigmoid colonic loops,   concerning for phlegmon or developing abscesses measuring 5.2 x 2.3 cm in   left lower quadrant. Additional loculated fluid collection in the pelvis   with somewhat rim enhancement measuring up to 6.3 cm.     Irregular appearance of bladder with thickened wall, abutting inflamed   sigmoid diverticulitis, possibility of colovesicular fistula considered.     Normal appendix.     These critical results were discussed via telephone at 2019 1:07 AM   by Dr. Ordaz of radiology with Dr. White, institution read-back   verification policy was followed.                 FERN ORDAZ M.D., ATTENDING RADIOLOGIST  This document has been electronically signed. 2019  1:14AM    < end of copied text >        ROS  [  ] UNABLE TO ELICIT HPI:  35 y/o man with PMH of asthma c/o abdominal pain for 1 x day. Pain sharp, diffuse, but more intense in LLQ/RLQ and suprapubic areas. Pt also felt nauseous and had 2-3 episodes of vomiting. Pt also c/o fever and urinary retention. Pt denies diarrhea, dysuria, fecaluria, CP, SOB, wheezing, dizziness or hx of similar symptoms in past.  WBC 11  CT abd/pel: complicated sigmoid diverticulitis with microperforation, phlegmon/abscess, loculated fluid in pelvis and questionable colovesical fistula (2019 04:06)    History as above, the patient is s/p Francisca procedure for perforated diverticulitis and intraabdominal abscesses/peritonitis, he had a washout of his abdomen today along with a hatmann's procedure. Currently he c/o abdominal pain and feeling thirsty. He was found to have a temp of 101.6 and an elevated wbc count here also.      PAST MEDICAL & SURGICAL HISTORY:  No pertinent past medical history  Asthma      No Known Drug Allergies  shellfish (Unknown)      Meds:  acetaminophen  Suppository .. 650 milliGRAM(s) Rectal every 6 hours PRN  ALBUTerol    90 MICROgram(s) HFA Inhaler 1 Puff(s) Inhalation four times a day PRN  chlorhexidine 4% Liquid 1 Application(s) Topical once  famotidine Injectable 20 milliGRAM(s) IV Push two times a day  heparin  Injectable 5000 Unit(s) SubCutaneous every 8 hours  HYDROmorphone  Injectable 1 milliGRAM(s) IV Push every 4 hours PRN  ipratropium 17 MICROgram(s) HFA Inhaler 1 Puff(s) Inhalation four times a day PRN  lactated ringers. 1000 milliLiter(s) IV Continuous <Continuous>  morphine  - Injectable 4 milliGRAM(s) IV Push every 4 hours PRN  ondansetron Injectable 4 milliGRAM(s) IV Push every 6 hours PRN  piperacillin/tazobactam IVPB.. 3.375 Gram(s) IV Intermittent every 8 hours      SOCIAL HISTORY:  Smoker: no  ETOH use: no  Ilicit Drug use: no      FAMILY HISTORY:      VITALS:  Vital Signs Last 24 Hrs  T(C): 37.5 (2019 15:16), Max: 38.7 (2019 05:00)  T(F): 99.5 (2019 15:16), Max: 101.6 (2019 05:00)  HR: 81 (2019 15:16) (71 - 113)  BP: 121/69 (2019 15:16) (107/56 - 126/76)  BP(mean): 87 (2019 14:30) (74 - 89)  RR: 16 (2019 15:16) (16 - 22)  SpO2: 95% (2019 15:16) (94% - 100%)    LABS/DIAGNOSTIC TESTS:                          15.5   15.48 )-----------( 247      ( 2019 06:11 )             46.4     WBC Count: 15.48 K/uL ( @ 06:11)  WBC Count: 11.25 K/uL ( @ 21:59)          139  |  108  |  9   ----------------------------<  121<H>  3.7   |  25  |  1.12    Ca    8.2<L>      2019 06:11    TPro  8.4<H>  /  Alb  4.1  /  TBili  1.1  /  DBili  x   /  AST  18  /  ALT  29  /  AlkPhos  86  07-11      Urinalysis Basic - ( 2019 07:18 )    Color: Yellow / Appearance: Clear / S.010 / pH: x  Gluc: x / Ketone: Negative  / Bili: Negative / Urobili: 1   Blood: x / Protein: 30 mg/dL / Nitrite: Negative   Leuk Esterase: Small / RBC: 5-10 /HPF / WBC 6-10 /HPF   Sq Epi: x / Non Sq Epi: Few /HPF / Bacteria: Moderate /HPF        LIVER FUNCTIONS - ( 2019 21:59 )  Alb: 4.1 g/dL / Pro: 8.4 g/dL / ALK PHOS: 86 U/L / ALT: 29 U/L DA / AST: 18 U/L / GGT: x                 LACTATE:    ABG -     CULTURES:       RADIOLOGY:< from: CT Abdomen and Pelvis w/ IV Cont (19 @ 23:58) >  EXAM:  CT ABDOMEN AND PELVIS IC                            PROCEDURE DATE:  2019          INTERPRETATION:  CT ABDOMEN AND PELVIS WITH CONTRAST    INDICATION: Right lower quadrant abdominal pain.    TECHNIQUE: Contrast enhanced CT of the abdomen and pelvis. Images are   reformatted in the sagittal and coronal planes.    90 mL of Omnipaque 350 contrast material was injected IV.    COMPARISON: None.    FINDINGS:    Lower Thorax: No consolidation or effusion.    Liver: No suspicious lesions.  Biliary: No dilatation. No calcified gallstones within the gallbladder.  Spleen: No suspicious lesions.  Pancreas: No inflammatory changes or ductal dilatation.  Adrenals: Normal.  Kidneys: Symmetrically enhancing without hydronephrosis.  Vessels: Normal caliber.    GI tract: Diverticulosis. Diffuse circumferential wall thickening of   distal descending as well as sigmoid colonic loops with extensive   pericolic stranding and fluid extending to the pelvis, concerning for   acute diverticulitis. Consider nonemergent colonoscopy evaluation once   inflammation subsides to exclude underlying neoplasm. There are punctate   bubbles of air adjacent to the inflamed sigmoid colonic loops, compatible   with perforation. Multiloculated fluid collectionssurrounding the   inflamed sigmoid colonic loops, concerning for phlegmon or developing   abscesses measuring 5.2 x 2.3 cm in left lower quadrant. No evidence of   small bowel obstruction. Normal appendix.    Peritoneum/retroperitoneum and mesentery:Mild pneumoperitoneum,   compatible with perforated diverticulitis. Mild ascites. Multiloculated   fluid collections surrounding the inflamed sigmoid colonic loops,   concerning for phlegmon or developing abscesses measuring 5.2 x 2.3 cm in   left lower quadrant. Additional loculated fluid collection in the pelvis   with somewhat rim enhancement measuring up to 6.3 cm as best seen on   image 65 of series 602. No adenopathy.    Pelvic organs/Bladder: Irregular appearance of bladder with thickened   wall, abutting inflamed sigmoid diverticulitis, possibility of   colovesicular fistula considered. Normal prostate.    Abdominal wall: Unremarkable.  Bones and soft tissues: Mild multilevel degenerative changes of the spine   are noted. Mild anterolisthesis of L4 on L5, likely due to bilateral   facet spondylolysis.    IMPRESSION:    Findings concerning for perforated diverticulitis involving distal   descending and sigmoid colonic loops. Consider colonoscopy evaluation   once inflammation subsides to exclude underlying neoplasm. Multiloculated   fluid collections surrounding the inflamed sigmoid colonic loops,   concerning for phlegmon or developing abscesses measuring 5.2 x 2.3 cm in   left lower quadrant. Additional loculated fluid collection in the pelvis   with somewhat rim enhancement measuring up to 6.3 cm.     Irregular appearance of bladder with thickened wall, abutting inflamed   sigmoid diverticulitis, possibility of colovesicular fistula considered.     Normal appendix.     These critical results were discussed via telephone at 2019 1:07 AM   by Dr. Ordaz of radiology with Dr. White, institution read-back   verification policy was followed.                 FERN ORDAZ M.D., ATTENDING RADIOLOGIST  This document has been electronically signed. 2019  1:14AM    < end of copied text >        ROS  [  ] UNABLE TO ELICIT

## 2019-07-12 NOTE — H&P ADULT - HISTORY OF PRESENT ILLNESS
35 y/o man with no significant PMH c/o abdominal pain for 1 x day 33 y/o man with PMH of asthma c/o abdominal pain for 1 x day. Pain sharp, diffuse, but more intense in LLQ/RLQ and suprapubic areas. Pt also felt nauseous and had 2-3 episodes of vomiting. Pt also c/o fever and urinary retention. Pt denies diarrhea, dysuria, fecaluria, CP, SOB, wheezing, dizziness or hx of similar symptoms in past.  WBC 11  CT abd/pel: complicated sigmoid diverticulitis with microperforation, phlegmon/abscess, loculated fluid in pelvis and questionable colovesical fistula

## 2019-07-12 NOTE — PROGRESS NOTE ADULT - SUBJECTIVE AND OBJECTIVE BOX
INTERVAL HPI/OVERNIGHT EVENTS:  Pt stable.   NPO  No flatus/BM.    Vital Signs Last 24 Hrs  T(C): 38.7 (12 Jul 2019 05:00), Max: 38.7 (12 Jul 2019 05:00)  T(F): 101.6 (12 Jul 2019 05:00), Max: 101.6 (12 Jul 2019 05:00)  HR: 84 (12 Jul 2019 05:00) (71 - 84)  BP: 109/76 (12 Jul 2019 05:00) (109/76 - 120/67)  BP(mean): --  RR: 18 (12 Jul 2019 05:00) (16 - 18)  SpO2: 97% (12 Jul 2019 05:00) (96% - 98%)    Physical:  Abdomen: Soft nondistended, significant lower abdominal tenderness, guarding and rebound tenderness   CT reviewed    I&O's Summary      LABS:                        15.5   15.48 )-----------( 247      ( 12 Jul 2019 06:11 )             46.4             07-12    139  |  108  |  9   ----------------------------<  121<H>  3.7   |  25  |  1.12    Ca    8.2<L>      12 Jul 2019 06:11    TPro  8.4<H>  /  Alb  4.1  /  TBili  1.1  /  DBili  x   /  AST  18  /  ALT  29  /  AlkPhos  86  07-11

## 2019-07-12 NOTE — H&P ADULT - ASSESSMENT
35 y/o man with complicated sigmoid diverticulitis with perisigmoid phlegmon/abscess and likely colovesical fistula

## 2019-07-12 NOTE — H&P ADULT - NSHPLABSRESULTS_GEN_ALL_CORE
17.0   11.25 )-----------( 289      ( 11 Jul 2019 21:59 )             50.1     07-11    141  |  107  |  8   ----------------------------<  128<H>  3.2<L>   |  25  |  1.05    Ca    8.4      11 Jul 2019 21:59    TPro  8.4<H>  /  Alb  4.1  /  TBili  1.1  /  DBili  x   /  AST  18  /  ALT  29  /  AlkPhos  86  07-11        EXAM:  CT ABDOMEN AND PELVIS IC                            PROCEDURE DATE:  07/11/2019          INTERPRETATION:  CT ABDOMEN AND PELVIS WITH CONTRAST    INDICATION: Right lower quadrant abdominal pain.    TECHNIQUE: Contrast enhanced CT of the abdomen and pelvis. Images are   reformatted in the sagittal and coronal planes.    90 mL of Omnipaque 350 contrast material was injected IV.    COMPARISON: None.    FINDINGS:    Lower Thorax: No consolidation or effusion.    Liver: No suspicious lesions.  Biliary: No dilatation. No calcified gallstones within the gallbladder.  Spleen: No suspicious lesions.  Pancreas: No inflammatory changes or ductal dilatation.  Adrenals: Normal.  Kidneys: Symmetrically enhancing without hydronephrosis.  Vessels: Normal caliber.    GI tract: Diverticulosis. Diffuse circumferential wall thickening of   distal descending as well as sigmoid colonic loops with extensive   pericolic stranding and fluid extending to the pelvis, concerning for   acute diverticulitis. Consider nonemergent colonoscopy evaluation once   inflammation subsides to exclude underlying neoplasm. There are punctate   bubbles of air adjacent to the inflamed sigmoid colonic loops, compatible   with perforation. Multiloculated fluid collectionssurrounding the   inflamed sigmoid colonic loops, concerning for phlegmon or developing   abscesses measuring 5.2 x 2.3 cm in left lower quadrant. No evidence of   small bowel obstruction. Normal appendix.    Peritoneum/retroperitoneum and mesentery:Mild pneumoperitoneum,   compatible with perforated diverticulitis. Mild ascites. Multiloculated   fluid collections surrounding the inflamed sigmoid colonic loops,   concerning for phlegmon or developing abscesses measuring 5.2 x 2.3 cm in   left lower quadrant. Additional loculated fluid collection in the pelvis   with somewhat rim enhancement measuring up to 6.3 cm as best seen on   image 65 of series 602. No adenopathy.    Pelvic organs/Bladder: Irregular appearance of bladder with thickened   wall, abutting inflamed sigmoid diverticulitis, possibility of   colovesicular fistula considered. Normal prostate.    Abdominal wall: Unremarkable.  Bones and soft tissues: Mild multilevel degenerative changes of the spine   are noted. Mild anterolisthesis of L4 on L5, likely due to bilateral   facet spondylolysis.    IMPRESSION:    Findings concerning for perforated diverticulitis involving distal   descending and sigmoid colonic loops. Consider colonoscopy evaluation   once inflammation subsides to exclude underlying neoplasm. Multiloculated   fluid collections surrounding the inflamed sigmoid colonic loops,   concerning for phlegmon or developing abscesses measuring 5.2 x 2.3 cm in   left lower quadrant. Additional loculated fluid collection in the pelvis   with somewhat rim enhancement measuring up to 6.3 cm.     Irregular appearance of bladder with thickened wall, abutting inflamed   sigmoid diverticulitis, possibility of colovesicular fistula considered.     Normal appendix.     These critical results were discussed via telephone at 7/12/2019 1:07 AM   by Dr. Ordaz of radiology with Dr. White, institution read-back   verification policy was followed.       FERN ORDAZ M.D., ATTENDING RADIOLOGIST  This document has been electronically signed. Jul 12 2019  1:14AM    < end of copied text >

## 2019-07-12 NOTE — ED ADULT NURSE REASSESSMENT NOTE - NS ED NURSE REASSESS COMMENT FT1
Pt reassessed, observed sleeping breathing room air, in no respiratory distress at time of reassessment. Pt is A&O x3, able to make needs known in [place], denies any distress/discomfort at this time. Skin intact, right AC #[id number] in place. Pt ambulates with assistance. Meds administered as ordered, tolerated well, admitted to [facility], report given to RANJEET Lorenzana by [facility] Awaiting transport, nursing monitoring continues.

## 2019-07-12 NOTE — CONSULT NOTE ADULT - ASSESSMENT
Acute Perforated Diverticulitis with perforation  Peritonitis/intraabdominal abscesses - s/p drainage and washout  Fevers  Leukocytosis    Plan - s/p surgery  cont Zosyn 3,375gms iv q8hrs  await peritoneal culture results.

## 2019-07-12 NOTE — BRIEF OPERATIVE NOTE - NSICDXBRIEFPOSTOP_GEN_ALL_CORE_FT
POST-OP DIAGNOSIS:  Perforation of sigmoid colon due to diverticulitis 12-Jul-2019 12:09:03  Slick Lopez

## 2019-07-12 NOTE — PROGRESS NOTE ADULT - ASSESSMENT
Condition discussed with patient, options risks and benefits explained.  Emergency laparotomy, colon resection and colostomy today.

## 2019-07-12 NOTE — CONSULT NOTE ADULT - RS GEN PE MLT RESP DETAILS PC
no rales/clear to auscultation bilaterally/no rhonchi/good air movement/no wheezes/breath sounds equal

## 2019-07-12 NOTE — H&P ADULT - PROBLEM SELECTOR PLAN 1
Admit to Surgery/Dr Alves  NPO, IVF  IV abx  Serial exmas  GI/DVT prophylaxis Admit to Surgery/Dr Alves  NPO, IVF  IV abx  FC  Serial  abdominal l exams   GI/DVT prophylaxis

## 2019-07-13 LAB
ANION GAP SERPL CALC-SCNC: 6 MMOL/L — SIGNIFICANT CHANGE UP (ref 5–17)
BASOPHILS # BLD AUTO: 0.02 K/UL — SIGNIFICANT CHANGE UP (ref 0–0.2)
BASOPHILS NFR BLD AUTO: 0.1 % — SIGNIFICANT CHANGE UP (ref 0–2)
BUN SERPL-MCNC: 11 MG/DL — SIGNIFICANT CHANGE UP (ref 7–18)
CALCIUM SERPL-MCNC: 8.4 MG/DL — SIGNIFICANT CHANGE UP (ref 8.4–10.5)
CHLORIDE SERPL-SCNC: 105 MMOL/L — SIGNIFICANT CHANGE UP (ref 96–108)
CO2 SERPL-SCNC: 26 MMOL/L — SIGNIFICANT CHANGE UP (ref 22–31)
CREAT SERPL-MCNC: 0.74 MG/DL — SIGNIFICANT CHANGE UP (ref 0.5–1.3)
EOSINOPHIL # BLD AUTO: 0 K/UL — SIGNIFICANT CHANGE UP (ref 0–0.5)
EOSINOPHIL NFR BLD AUTO: 0 % — SIGNIFICANT CHANGE UP (ref 0–6)
GLUCOSE SERPL-MCNC: 115 MG/DL — HIGH (ref 70–99)
HCT VFR BLD CALC: 41.8 % — SIGNIFICANT CHANGE UP (ref 39–50)
HGB BLD-MCNC: 14 G/DL — SIGNIFICANT CHANGE UP (ref 13–17)
IMM GRANULOCYTES NFR BLD AUTO: 0.7 % — SIGNIFICANT CHANGE UP (ref 0–1.5)
LYMPHOCYTES # BLD AUTO: 0.86 K/UL — LOW (ref 1–3.3)
LYMPHOCYTES # BLD AUTO: 5.7 % — LOW (ref 13–44)
MCHC RBC-ENTMCNC: 32.7 PG — SIGNIFICANT CHANGE UP (ref 27–34)
MCHC RBC-ENTMCNC: 33.5 GM/DL — SIGNIFICANT CHANGE UP (ref 32–36)
MCV RBC AUTO: 97.7 FL — SIGNIFICANT CHANGE UP (ref 80–100)
MONOCYTES # BLD AUTO: 0.98 K/UL — HIGH (ref 0–0.9)
MONOCYTES NFR BLD AUTO: 6.5 % — SIGNIFICANT CHANGE UP (ref 2–14)
NEUTROPHILS # BLD AUTO: 13.18 K/UL — HIGH (ref 1.8–7.4)
NEUTROPHILS NFR BLD AUTO: 87 % — HIGH (ref 43–77)
NRBC # BLD: 0 /100 WBCS — SIGNIFICANT CHANGE UP (ref 0–0)
PLATELET # BLD AUTO: 223 K/UL — SIGNIFICANT CHANGE UP (ref 150–400)
POTASSIUM SERPL-MCNC: 3.9 MMOL/L — SIGNIFICANT CHANGE UP (ref 3.5–5.3)
POTASSIUM SERPL-SCNC: 3.9 MMOL/L — SIGNIFICANT CHANGE UP (ref 3.5–5.3)
RBC # BLD: 4.28 M/UL — SIGNIFICANT CHANGE UP (ref 4.2–5.8)
RBC # FLD: 11.9 % — SIGNIFICANT CHANGE UP (ref 10.3–14.5)
SODIUM SERPL-SCNC: 137 MMOL/L — SIGNIFICANT CHANGE UP (ref 135–145)
WBC # BLD: 15.15 K/UL — HIGH (ref 3.8–10.5)
WBC # FLD AUTO: 15.15 K/UL — HIGH (ref 3.8–10.5)

## 2019-07-13 RX ADMIN — SODIUM CHLORIDE 150 MILLILITER(S): 9 INJECTION, SOLUTION INTRAVENOUS at 15:23

## 2019-07-13 RX ADMIN — Medication 650 MILLIGRAM(S): at 19:51

## 2019-07-13 RX ADMIN — PIPERACILLIN AND TAZOBACTAM 25 GRAM(S): 4; .5 INJECTION, POWDER, LYOPHILIZED, FOR SOLUTION INTRAVENOUS at 05:14

## 2019-07-13 RX ADMIN — HEPARIN SODIUM 5000 UNIT(S): 5000 INJECTION INTRAVENOUS; SUBCUTANEOUS at 05:14

## 2019-07-13 RX ADMIN — PIPERACILLIN AND TAZOBACTAM 25 GRAM(S): 4; .5 INJECTION, POWDER, LYOPHILIZED, FOR SOLUTION INTRAVENOUS at 15:23

## 2019-07-13 RX ADMIN — HEPARIN SODIUM 5000 UNIT(S): 5000 INJECTION INTRAVENOUS; SUBCUTANEOUS at 15:25

## 2019-07-13 RX ADMIN — Medication 650 MILLIGRAM(S): at 18:47

## 2019-07-13 RX ADMIN — HYDROMORPHONE HYDROCHLORIDE 1 MILLIGRAM(S): 2 INJECTION INTRAMUSCULAR; INTRAVENOUS; SUBCUTANEOUS at 00:14

## 2019-07-13 RX ADMIN — FAMOTIDINE 20 MILLIGRAM(S): 10 INJECTION INTRAVENOUS at 05:14

## 2019-07-13 RX ADMIN — HEPARIN SODIUM 5000 UNIT(S): 5000 INJECTION INTRAVENOUS; SUBCUTANEOUS at 21:28

## 2019-07-13 RX ADMIN — FAMOTIDINE 20 MILLIGRAM(S): 10 INJECTION INTRAVENOUS at 17:23

## 2019-07-13 RX ADMIN — PIPERACILLIN AND TAZOBACTAM 25 GRAM(S): 4; .5 INJECTION, POWDER, LYOPHILIZED, FOR SOLUTION INTRAVENOUS at 21:28

## 2019-07-13 NOTE — PROGRESS NOTE ADULT - ASSESSMENT
S/P Jaquez's procedure for perforated sigmoid diverticulitis  POD # 1  Afebrile, good UO  Hemodynamically stable    -OOB, IS  -Ice chips  -Wound dressing with wet-to-dry QD  -Pain management  -Colostomy care  -GI/DVT prophylaxis

## 2019-07-13 NOTE — PROGRESS NOTE ADULT - SUBJECTIVE AND OBJECTIVE BOX
POD # 1    Pt seen and examined at bedside. Pt c/o incisional pain; denies N/V, fever/chills.    PE: comfortable, NAD    Vital Signs Last 24 Hrs  T(C): 37.4 (13 Jul 2019 06:22), Max: 37.7 (13 Jul 2019 00:15)  T(F): 99.4 (13 Jul 2019 06:22), Max: 99.9 (13 Jul 2019 00:15)  HR: 69 (13 Jul 2019 06:22) (69 - 113)  BP: 115/56 (13 Jul 2019 06:22) (107/56 - 126/76)  BP(mean): 87 (12 Jul 2019 14:30) (74 - 89)  RR: 16 (13 Jul 2019 06:22) (16 - 22)  SpO2: 96% (13 Jul 2019 06:22) (94% - 100%)    Chest: B/L BS, decreased at bases, no wheezing  CVS: S1S2, RRR  Abd: soft, ND, hypoactive BS, open wound w/o bleeding; wet-to-dry dressing changed; colostomy pink, no air in bag  Ext: no edema, calf soft      I&O's Detail    12 Jul 2019 07:01  -  13 Jul 2019 07:00  --------------------------------------------------------  IN:    Lactated Ringers IV Bolus: 1800 mL  Total IN: 1800 mL    OUT:    Estimated Blood Loss: 25 mL    Indwelling Catheter - Urethral: 2760 mL    Nasoenteral Tube: 200 mL  Total OUT: 2985 mL    Total NET: -1185 mL                            14.0   15.15 )-----------( 223      ( 13 Jul 2019 07:26 )             41.8   07-13    137  |  105  |  11  ----------------------------<  115<H>  3.9   |  26  |  0.74    Ca    8.4      13 Jul 2019 07:26    TPro  8.4<H>  /  Alb  4.1  /  TBili  1.1  /  DBili  x   /  AST  18  /  ALT  29  /  AlkPhos  86  07-11    Urine Microscopic-Add On (NC) (07.12.19 @ 07:18)    Red Blood Cell - Urine: 5-10 /HPF    White Blood Cell - Urine: 6-10 /HPF    Hyaline Casts: 0-2 /LPF    Bacteria: Moderate /HPF    Epithelial Cells: Few /HPF    MEDICATIONS  (STANDING):  famotidine Injectable 20 milliGRAM(s) IV Push two times a day  heparin  Injectable 5000 Unit(s) SubCutaneous every 8 hours  lactated ringers. 1000 milliLiter(s) (150 mL/Hr) IV Continuous <Continuous>  piperacillin/tazobactam IVPB.. 3.375 Gram(s) IV Intermittent every 8 hours    MEDICATIONS  (PRN):  acetaminophen  Suppository .. 650 milliGRAM(s) Rectal every 6 hours PRN Temp greater or equal to 38C (100.4F), Mild Pain (1 - 3)  ALBUTerol    90 MICROgram(s) HFA Inhaler 1 Puff(s) Inhalation four times a day PRN Shortness of Breath and/or Wheezing  HYDROmorphone  Injectable 1 milliGRAM(s) IV Push every 4 hours PRN Severe Pain (7 - 10)  ipratropium 17 MICROgram(s) HFA Inhaler 1 Puff(s) Inhalation four times a day PRN SOB or wheezing  morphine  - Injectable 4 milliGRAM(s) IV Push every 4 hours PRN Moderate Pain (4 - 6)  ondansetron Injectable 4 milliGRAM(s) IV Push every 6 hours PRN Nausea and/or Vomiting

## 2019-07-13 NOTE — PROGRESS NOTE ADULT - SUBJECTIVE AND OBJECTIVE BOX
POSTOP NOTE    Patient is doing okay.      Vital Signs Last 24 Hrs  T(C): 37.7 (2019 00:15), Max: 38.7 (2019 05:00)  T(F): 99.9 (2019 00:15), Max: 101.6 (2019 05:00)  HR: 81 (2019 00:15) (71 - 113)  BP: 121/69 (2019 00:15) (107/56 - 126/76)  BP(mean): 87 (2019 14:30) (74 - 89)  RR: 16 (2019 00:15) (16 - 22)  SpO2: 97% (2019 00:15) (94% - 100%)    Daily     Daily     I&O's Detail    2019 07:01  -  2019 00:56  --------------------------------------------------------  IN:    Lactated Ringers IV Bolus: 1800 mL  Total IN: 1800 mL    OUT:    Estimated Blood Loss: 25 mL    Indwelling Catheter - Urethral: 1760 mL  Total OUT: 1785 mL    Total NET: 15 mL          MEDICATIONS  (STANDING):  famotidine Injectable 20 milliGRAM(s) IV Push two times a day  heparin  Injectable 5000 Unit(s) SubCutaneous every 8 hours  lactated ringers. 1000 milliLiter(s) (150 mL/Hr) IV Continuous <Continuous>  piperacillin/tazobactam IVPB.. 3.375 Gram(s) IV Intermittent every 8 hours    MEDICATIONS  (PRN):  acetaminophen  Suppository .. 650 milliGRAM(s) Rectal every 6 hours PRN Temp greater or equal to 38C (100.4F), Mild Pain (1 - 3)  ALBUTerol    90 MICROgram(s) HFA Inhaler 1 Puff(s) Inhalation four times a day PRN Shortness of Breath and/or Wheezing  HYDROmorphone  Injectable 1 milliGRAM(s) IV Push every 4 hours PRN Severe Pain (7 - 10)  ipratropium 17 MICROgram(s) HFA Inhaler 1 Puff(s) Inhalation four times a day PRN SOB or wheezing  morphine  - Injectable 4 milliGRAM(s) IV Push every 4 hours PRN Moderate Pain (4 - 6)  ondansetron Injectable 4 milliGRAM(s) IV Push every 6 hours PRN Nausea and/or Vomiting      PHYSICAL EXAM:-  CONSTITIONAL/GENERAL: In no acute distress  RESPIRATORY/CHEST: Clear to percussion bilaterally; Normal respiratory effort  CARDIOVASCULAR/HEART: Regular rate and rhythm  GASTROINTESTINAL/ABDOMEN: Soft, dressing clean, Colostomy pink, viable, Nondistended; Bowel sounds present  MUSCULOSKELETAL/EXTREMITIES:  No clubbing, cyanosis, or edema    LABS:-                        15.5   15.48 )-----------( 247      ( 2019 06:11 )             46.4     Neutrophil %:       139  |  108  |  9   ----------------------------<  121<H>  3.7   |  25  |  1.12    Ca    8.2<L>      2019 06:11    TPro  8.4<H>  /  Alb  4.1  /  TBili  1.1  /  DBili  x   /  AST  18  /  ALT  29  /  AlkPhos  86  07-11      Urinalysis Basic - ( 2019 07:18 )    Color: Yellow / Appearance: Clear / S.010 / pH: x  Gluc: x / Ketone: Negative  / Bili: Negative / Urobili: 1   Blood: x / Protein: 30 mg/dL / Nitrite: Negative   Leuk Esterase: Small / RBC: 5-10 /HPF / WBC 6-10 /HPF   Sq Epi: x / Non Sq Epi: Few /HPF / Bacteria: Moderate /HPF        Lipase, Serum: 39 U/L (19 @ 21:59)          S/P Jaquez's Procedure  Doing well  NGT decompression  White drainage  Postop care

## 2019-07-14 LAB
ANION GAP SERPL CALC-SCNC: 7 MMOL/L — SIGNIFICANT CHANGE UP (ref 5–17)
BUN SERPL-MCNC: 12 MG/DL — SIGNIFICANT CHANGE UP (ref 7–18)
CALCIUM SERPL-MCNC: 8.1 MG/DL — LOW (ref 8.4–10.5)
CHLORIDE SERPL-SCNC: 106 MMOL/L — SIGNIFICANT CHANGE UP (ref 96–108)
CO2 SERPL-SCNC: 26 MMOL/L — SIGNIFICANT CHANGE UP (ref 22–31)
CREAT SERPL-MCNC: 0.77 MG/DL — SIGNIFICANT CHANGE UP (ref 0.5–1.3)
CULTURE RESULTS: NO GROWTH — SIGNIFICANT CHANGE UP
CULTURE RESULTS: SIGNIFICANT CHANGE UP
CULTURE RESULTS: SIGNIFICANT CHANGE UP
GLUCOSE SERPL-MCNC: 83 MG/DL — SIGNIFICANT CHANGE UP (ref 70–99)
HCT VFR BLD CALC: 40.2 % — SIGNIFICANT CHANGE UP (ref 39–50)
HGB BLD-MCNC: 13.2 G/DL — SIGNIFICANT CHANGE UP (ref 13–17)
MCHC RBC-ENTMCNC: 32.3 PG — SIGNIFICANT CHANGE UP (ref 27–34)
MCHC RBC-ENTMCNC: 32.8 GM/DL — SIGNIFICANT CHANGE UP (ref 32–36)
MCV RBC AUTO: 98.3 FL — SIGNIFICANT CHANGE UP (ref 80–100)
NRBC # BLD: 0 /100 WBCS — SIGNIFICANT CHANGE UP (ref 0–0)
PLATELET # BLD AUTO: 239 K/UL — SIGNIFICANT CHANGE UP (ref 150–400)
POTASSIUM SERPL-MCNC: 3.6 MMOL/L — SIGNIFICANT CHANGE UP (ref 3.5–5.3)
POTASSIUM SERPL-SCNC: 3.6 MMOL/L — SIGNIFICANT CHANGE UP (ref 3.5–5.3)
RBC # BLD: 4.09 M/UL — LOW (ref 4.2–5.8)
RBC # FLD: 11.9 % — SIGNIFICANT CHANGE UP (ref 10.3–14.5)
SODIUM SERPL-SCNC: 139 MMOL/L — SIGNIFICANT CHANGE UP (ref 135–145)
SPECIMEN SOURCE: SIGNIFICANT CHANGE UP
WBC # BLD: 12.39 K/UL — HIGH (ref 3.8–10.5)
WBC # FLD AUTO: 12.39 K/UL — HIGH (ref 3.8–10.5)

## 2019-07-14 RX ADMIN — MORPHINE SULFATE 4 MILLIGRAM(S): 50 CAPSULE, EXTENDED RELEASE ORAL at 07:54

## 2019-07-14 RX ADMIN — PIPERACILLIN AND TAZOBACTAM 25 GRAM(S): 4; .5 INJECTION, POWDER, LYOPHILIZED, FOR SOLUTION INTRAVENOUS at 21:43

## 2019-07-14 RX ADMIN — SODIUM CHLORIDE 150 MILLILITER(S): 9 INJECTION, SOLUTION INTRAVENOUS at 17:51

## 2019-07-14 RX ADMIN — HEPARIN SODIUM 5000 UNIT(S): 5000 INJECTION INTRAVENOUS; SUBCUTANEOUS at 13:16

## 2019-07-14 RX ADMIN — Medication 650 MILLIGRAM(S): at 23:30

## 2019-07-14 RX ADMIN — HEPARIN SODIUM 5000 UNIT(S): 5000 INJECTION INTRAVENOUS; SUBCUTANEOUS at 05:11

## 2019-07-14 RX ADMIN — PIPERACILLIN AND TAZOBACTAM 25 GRAM(S): 4; .5 INJECTION, POWDER, LYOPHILIZED, FOR SOLUTION INTRAVENOUS at 05:11

## 2019-07-14 RX ADMIN — Medication 650 MILLIGRAM(S): at 22:31

## 2019-07-14 RX ADMIN — FAMOTIDINE 20 MILLIGRAM(S): 10 INJECTION INTRAVENOUS at 05:11

## 2019-07-14 RX ADMIN — SODIUM CHLORIDE 150 MILLILITER(S): 9 INJECTION, SOLUTION INTRAVENOUS at 21:44

## 2019-07-14 RX ADMIN — SODIUM CHLORIDE 150 MILLILITER(S): 9 INJECTION, SOLUTION INTRAVENOUS at 16:04

## 2019-07-14 RX ADMIN — MORPHINE SULFATE 4 MILLIGRAM(S): 50 CAPSULE, EXTENDED RELEASE ORAL at 08:25

## 2019-07-14 RX ADMIN — HEPARIN SODIUM 5000 UNIT(S): 5000 INJECTION INTRAVENOUS; SUBCUTANEOUS at 21:44

## 2019-07-14 RX ADMIN — PIPERACILLIN AND TAZOBACTAM 25 GRAM(S): 4; .5 INJECTION, POWDER, LYOPHILIZED, FOR SOLUTION INTRAVENOUS at 13:16

## 2019-07-14 RX ADMIN — FAMOTIDINE 20 MILLIGRAM(S): 10 INJECTION INTRAVENOUS at 17:51

## 2019-07-14 NOTE — PROGRESS NOTE ADULT - ASSESSMENT
31 y/o Male s/p Jaquez's procedure 7/12 for perforated sigmoid diverticulitis    -Advance diet to clears   -Pain medication PRN   -C/w Abx   -ID f/u   -Dc White catheter, TOV   -Daily dressing changes   -Ostomy care   -DVT ppx   -OOB/ Ambulate

## 2019-07-14 NOTE — PROGRESS NOTE ADULT - SUBJECTIVE AND OBJECTIVE BOX
INTERVAL HPI/OVERNIGHT EVENTS:    Pt seen and examined at bedside. Admits to incisional pain, denies nausea or vomiting; No fever, chills, SOB or CP.     Vital Signs Last 24 Hrs  T(C): 37.3 (14 Jul 2019 05:34), Max: 38 (13 Jul 2019 18:45)  T(F): 99.1 (14 Jul 2019 05:34), Max: 100.4 (13 Jul 2019 18:45)  HR: 54 (14 Jul 2019 05:34) (54 - 62)  BP: 102/54 (14 Jul 2019 05:34) (102/54 - 110/70)  BP(mean): --  RR: 17 (14 Jul 2019 05:34) (16 - 18)  SpO2: 99% (14 Jul 2019 05:34) (98% - 99%)  I&O's Detail    13 Jul 2019 07:01  -  14 Jul 2019 07:00  --------------------------------------------------------  IN:  Total IN: 0 mL    OUT:    Colostomy: 100 mL    Indwelling Catheter - Urethral: 3500 mL  Total OUT: 3600 mL    Total NET: -3600 mL        famotidine Injectable 20 milliGRAM(s) IV Push two times a day  piperacillin/tazobactam IVPB.. 3.375 Gram(s) IV Intermittent every 8 hours      Physical Exam  General: AAOx3, No acute distress  Skin: No jaundice, no icterus  Abdomen: soft,  nondistended, incisional TTP, midline wound open w/ granulating tissue at base, no drainage noted, wound edges clean, wound packed w/ wet to dry, ostomy mucosa pink and viable, liquid stool present in ostomy bag  : Normal external genitalia, richey ion place, UO yellow and clear   Extremities: non edematous, no calf pain bilaterally        Labs:                        13.2   12.39 )-----------( 239      ( 14 Jul 2019 07:00 )             40.2     07-14    139  |  106  |  12  ----------------------------<  83  3.6   |  26  |  0.77    Ca    8.1<L>      14 Jul 2019 07:00

## 2019-07-15 LAB
ANION GAP SERPL CALC-SCNC: 8 MMOL/L — SIGNIFICANT CHANGE UP (ref 5–17)
BUN SERPL-MCNC: 9 MG/DL — SIGNIFICANT CHANGE UP (ref 7–18)
CALCIUM SERPL-MCNC: 8.3 MG/DL — LOW (ref 8.4–10.5)
CHLORIDE SERPL-SCNC: 106 MMOL/L — SIGNIFICANT CHANGE UP (ref 96–108)
CO2 SERPL-SCNC: 25 MMOL/L — SIGNIFICANT CHANGE UP (ref 22–31)
CREAT SERPL-MCNC: 0.72 MG/DL — SIGNIFICANT CHANGE UP (ref 0.5–1.3)
GLUCOSE SERPL-MCNC: 81 MG/DL — SIGNIFICANT CHANGE UP (ref 70–99)
HCT VFR BLD CALC: 42.2 % — SIGNIFICANT CHANGE UP (ref 39–50)
HGB BLD-MCNC: 14.3 G/DL — SIGNIFICANT CHANGE UP (ref 13–17)
MCHC RBC-ENTMCNC: 32.9 PG — SIGNIFICANT CHANGE UP (ref 27–34)
MCHC RBC-ENTMCNC: 33.9 GM/DL — SIGNIFICANT CHANGE UP (ref 32–36)
MCV RBC AUTO: 97 FL — SIGNIFICANT CHANGE UP (ref 80–100)
NRBC # BLD: 0 /100 WBCS — SIGNIFICANT CHANGE UP (ref 0–0)
PLATELET # BLD AUTO: 271 K/UL — SIGNIFICANT CHANGE UP (ref 150–400)
POTASSIUM SERPL-MCNC: 3.4 MMOL/L — LOW (ref 3.5–5.3)
POTASSIUM SERPL-SCNC: 3.4 MMOL/L — LOW (ref 3.5–5.3)
RBC # BLD: 4.35 M/UL — SIGNIFICANT CHANGE UP (ref 4.2–5.8)
RBC # FLD: 11.8 % — SIGNIFICANT CHANGE UP (ref 10.3–14.5)
SODIUM SERPL-SCNC: 139 MMOL/L — SIGNIFICANT CHANGE UP (ref 135–145)
WBC # BLD: 8.94 K/UL — SIGNIFICANT CHANGE UP (ref 3.8–10.5)
WBC # FLD AUTO: 8.94 K/UL — SIGNIFICANT CHANGE UP (ref 3.8–10.5)

## 2019-07-15 RX ORDER — POTASSIUM CHLORIDE 20 MEQ
10 PACKET (EA) ORAL
Refills: 0 | Status: COMPLETED | OUTPATIENT
Start: 2019-07-15 | End: 2019-07-15

## 2019-07-15 RX ORDER — SODIUM CHLORIDE 9 MG/ML
1000 INJECTION, SOLUTION INTRAVENOUS
Refills: 0 | Status: DISCONTINUED | OUTPATIENT
Start: 2019-07-15 | End: 2019-07-20

## 2019-07-15 RX ADMIN — PIPERACILLIN AND TAZOBACTAM 25 GRAM(S): 4; .5 INJECTION, POWDER, LYOPHILIZED, FOR SOLUTION INTRAVENOUS at 21:48

## 2019-07-15 RX ADMIN — HYDROMORPHONE HYDROCHLORIDE 1 MILLIGRAM(S): 2 INJECTION INTRAMUSCULAR; INTRAVENOUS; SUBCUTANEOUS at 12:32

## 2019-07-15 RX ADMIN — HEPARIN SODIUM 5000 UNIT(S): 5000 INJECTION INTRAVENOUS; SUBCUTANEOUS at 17:07

## 2019-07-15 RX ADMIN — SODIUM CHLORIDE 125 MILLILITER(S): 9 INJECTION, SOLUTION INTRAVENOUS at 12:31

## 2019-07-15 RX ADMIN — MORPHINE SULFATE 4 MILLIGRAM(S): 50 CAPSULE, EXTENDED RELEASE ORAL at 19:57

## 2019-07-15 RX ADMIN — PIPERACILLIN AND TAZOBACTAM 25 GRAM(S): 4; .5 INJECTION, POWDER, LYOPHILIZED, FOR SOLUTION INTRAVENOUS at 05:17

## 2019-07-15 RX ADMIN — Medication 100 MILLIEQUIVALENT(S): at 12:32

## 2019-07-15 RX ADMIN — FAMOTIDINE 20 MILLIGRAM(S): 10 INJECTION INTRAVENOUS at 05:17

## 2019-07-15 RX ADMIN — HYDROMORPHONE HYDROCHLORIDE 1 MILLIGRAM(S): 2 INJECTION INTRAMUSCULAR; INTRAVENOUS; SUBCUTANEOUS at 11:27

## 2019-07-15 RX ADMIN — HEPARIN SODIUM 5000 UNIT(S): 5000 INJECTION INTRAVENOUS; SUBCUTANEOUS at 21:49

## 2019-07-15 RX ADMIN — FAMOTIDINE 20 MILLIGRAM(S): 10 INJECTION INTRAVENOUS at 17:07

## 2019-07-15 RX ADMIN — PIPERACILLIN AND TAZOBACTAM 25 GRAM(S): 4; .5 INJECTION, POWDER, LYOPHILIZED, FOR SOLUTION INTRAVENOUS at 15:06

## 2019-07-15 RX ADMIN — MORPHINE SULFATE 4 MILLIGRAM(S): 50 CAPSULE, EXTENDED RELEASE ORAL at 19:42

## 2019-07-15 RX ADMIN — HEPARIN SODIUM 5000 UNIT(S): 5000 INJECTION INTRAVENOUS; SUBCUTANEOUS at 05:17

## 2019-07-15 RX ADMIN — Medication 100 MILLIEQUIVALENT(S): at 10:31

## 2019-07-15 RX ADMIN — Medication 100 MILLIEQUIVALENT(S): at 11:31

## 2019-07-15 NOTE — PROGRESS NOTE ADULT - SUBJECTIVE AND OBJECTIVE BOX
s/p Francisca's 7/12, POD #3      Patient examined at bedside, having abdominal pain  Fevers overnight  No nausea, no vomiting  Tolerating clears  On Diet  On Zosyn        T(F): 99.2 (07-15-19 @ 05:07), Max: 100.5 (07-14-19 @ 22:33)  HR: 60 (07-15-19 @ 05:07) (54 - 66)  BP: 114/67 (07-15-19 @ 05:07) (110/57 - 131/65)  RR: 17 (07-15-19 @ 05:07) (17 - 18)  SpO2: 96% (07-15-19 @ 05:07) (96% - 98%)  Wt(kg): --      07-14 @ 07:01  -  07-15 @ 07:00  --------------------------------------------------------  IN:    IV PiggyBack: 200 mL    lactated ringers.: 1800 mL  Total IN: 2000 mL    OUT:    Colostomy: 500 mL    Indwelling Catheter - Urethral: 50 mL    Voided: 130 mL  Total OUT: 680 mL    Total NET: 1320 mL          Physical Exam  General: AAOx3, No acute distress  Skin: No jaundice, no icterus  Abdomen: soft, incisional tenderness, distended, no rebound tenderness, no guarding, no palpable masses, midline wound open, clean, packing changed, stoma with bowel sweat, no real function  Extremities: non edematous, no calf pain bilaterally

## 2019-07-15 NOTE — PROGRESS NOTE ADULT - SUBJECTIVE AND OBJECTIVE BOX
INTERVAL HPI/OVERNIGHT EVENTS:  Pt stable.   Tolerating diet, clear liquids  Colostomy liquid output    Vital Signs Last 24 Hrs  T(C): 37.3 (15 Jul 2019 05:07), Max: 38.1 (14 Jul 2019 22:33)  T(F): 99.2 (15 Jul 2019 05:07), Max: 100.5 (14 Jul 2019 22:33)  HR: 60 (15 Jul 2019 05:07) (54 - 66)  BP: 114/67 (15 Jul 2019 05:07) (110/57 - 131/65)  BP(mean): --  RR: 17 (15 Jul 2019 05:07) (17 - 18)  SpO2: 96% (15 Jul 2019 05:07) (96% - 98%)    Physical:  Abdomen: Soft nondistended, nontender.  Wound clean  Colostomy viable    I&O's Summary    14 Jul 2019 07:01  -  15 Jul 2019 07:00  --------------------------------------------------------  IN: 2000 mL / OUT: 680 mL / NET: 1320 mL        LABS:                        14.3   8.94  )-----------( 271      ( 15 Jul 2019 07:32 )             42.2             07-15    139  |  106  |  9   ----------------------------<  81  3.4<L>   |  25  |  0.72    Ca    8.3<L>      15 Jul 2019 07:32

## 2019-07-15 NOTE — DIETITIAN INITIAL EVALUATION ADULT. - ADD RECOMMEND
Advance diet to include ostomy (or low residue), as medically feasible. MD to monitor. RD available.

## 2019-07-15 NOTE — PROGRESS NOTE ADULT - ASSESSMENT
35 y/o male s/p Francisca's Procedure for Perforated Sigmoid Diverticulitis, Febrile      1. Continue clears, would not advance due to abdominal distention  2. Needs to be out of bed  3. Continue antibiotics  4. DVT PPx  5. Daily wound care

## 2019-07-15 NOTE — DIETITIAN INITIAL EVALUATION ADULT. - PERTINENT MEDS FT
MEDICATIONS  (STANDING):  dextrose 5% + sodium chloride 0.45% 1000 milliLiter(s) (125 mL/Hr) IV Continuous <Continuous>  famotidine Injectable 20 milliGRAM(s) IV Push two times a day  heparin  Injectable 5000 Unit(s) SubCutaneous every 8 hours  piperacillin/tazobactam IVPB.. 3.375 Gram(s) IV Intermittent every 8 hours    MEDICATIONS  (PRN):  acetaminophen  Suppository .. 650 milliGRAM(s) Rectal every 6 hours PRN Temp greater or equal to 38C (100.4F), Mild Pain (1 - 3)  ALBUTerol    90 MICROgram(s) HFA Inhaler 1 Puff(s) Inhalation four times a day PRN Shortness of Breath and/or Wheezing  HYDROmorphone  Injectable 1 milliGRAM(s) IV Push every 4 hours PRN Severe Pain (7 - 10)  ipratropium 17 MICROgram(s) HFA Inhaler 1 Puff(s) Inhalation four times a day PRN SOB or wheezing  morphine  - Injectable 4 milliGRAM(s) IV Push every 4 hours PRN Moderate Pain (4 - 6)  ondansetron Injectable 4 milliGRAM(s) IV Push every 6 hours PRN Nausea and/or Vomiting

## 2019-07-16 LAB
ANION GAP SERPL CALC-SCNC: 6 MMOL/L — SIGNIFICANT CHANGE UP (ref 5–17)
BUN SERPL-MCNC: 7 MG/DL — SIGNIFICANT CHANGE UP (ref 7–18)
CALCIUM SERPL-MCNC: 8 MG/DL — LOW (ref 8.4–10.5)
CHLORIDE SERPL-SCNC: 107 MMOL/L — SIGNIFICANT CHANGE UP (ref 96–108)
CO2 SERPL-SCNC: 26 MMOL/L — SIGNIFICANT CHANGE UP (ref 22–31)
CREAT SERPL-MCNC: 0.69 MG/DL — SIGNIFICANT CHANGE UP (ref 0.5–1.3)
GLUCOSE SERPL-MCNC: 107 MG/DL — HIGH (ref 70–99)
HCT VFR BLD CALC: 42 % — SIGNIFICANT CHANGE UP (ref 39–50)
HGB BLD-MCNC: 14.1 G/DL — SIGNIFICANT CHANGE UP (ref 13–17)
MCHC RBC-ENTMCNC: 32.5 PG — SIGNIFICANT CHANGE UP (ref 27–34)
MCHC RBC-ENTMCNC: 33.6 GM/DL — SIGNIFICANT CHANGE UP (ref 32–36)
MCV RBC AUTO: 96.8 FL — SIGNIFICANT CHANGE UP (ref 80–100)
NRBC # BLD: 0 /100 WBCS — SIGNIFICANT CHANGE UP (ref 0–0)
PLATELET # BLD AUTO: 287 K/UL — SIGNIFICANT CHANGE UP (ref 150–400)
POTASSIUM SERPL-MCNC: 3.6 MMOL/L — SIGNIFICANT CHANGE UP (ref 3.5–5.3)
POTASSIUM SERPL-SCNC: 3.6 MMOL/L — SIGNIFICANT CHANGE UP (ref 3.5–5.3)
RBC # BLD: 4.34 M/UL — SIGNIFICANT CHANGE UP (ref 4.2–5.8)
RBC # FLD: 11.7 % — SIGNIFICANT CHANGE UP (ref 10.3–14.5)
SODIUM SERPL-SCNC: 139 MMOL/L — SIGNIFICANT CHANGE UP (ref 135–145)
WBC # BLD: 9.91 K/UL — SIGNIFICANT CHANGE UP (ref 3.8–10.5)
WBC # FLD AUTO: 9.91 K/UL — SIGNIFICANT CHANGE UP (ref 3.8–10.5)

## 2019-07-16 RX ORDER — ACETAMINOPHEN 500 MG
1000 TABLET ORAL ONCE
Refills: 0 | Status: DISCONTINUED | OUTPATIENT
Start: 2019-07-16 | End: 2019-07-20

## 2019-07-16 RX ORDER — KETOROLAC TROMETHAMINE 30 MG/ML
30 SYRINGE (ML) INJECTION EVERY 6 HOURS
Refills: 0 | Status: DISCONTINUED | OUTPATIENT
Start: 2019-07-16 | End: 2019-07-20

## 2019-07-16 RX ADMIN — FAMOTIDINE 20 MILLIGRAM(S): 10 INJECTION INTRAVENOUS at 05:28

## 2019-07-16 RX ADMIN — HEPARIN SODIUM 5000 UNIT(S): 5000 INJECTION INTRAVENOUS; SUBCUTANEOUS at 13:15

## 2019-07-16 RX ADMIN — HEPARIN SODIUM 5000 UNIT(S): 5000 INJECTION INTRAVENOUS; SUBCUTANEOUS at 21:35

## 2019-07-16 RX ADMIN — MORPHINE SULFATE 4 MILLIGRAM(S): 50 CAPSULE, EXTENDED RELEASE ORAL at 03:40

## 2019-07-16 RX ADMIN — PIPERACILLIN AND TAZOBACTAM 25 GRAM(S): 4; .5 INJECTION, POWDER, LYOPHILIZED, FOR SOLUTION INTRAVENOUS at 13:15

## 2019-07-16 RX ADMIN — SODIUM CHLORIDE 125 MILLILITER(S): 9 INJECTION, SOLUTION INTRAVENOUS at 05:28

## 2019-07-16 RX ADMIN — PIPERACILLIN AND TAZOBACTAM 25 GRAM(S): 4; .5 INJECTION, POWDER, LYOPHILIZED, FOR SOLUTION INTRAVENOUS at 05:28

## 2019-07-16 RX ADMIN — FAMOTIDINE 20 MILLIGRAM(S): 10 INJECTION INTRAVENOUS at 17:14

## 2019-07-16 RX ADMIN — HEPARIN SODIUM 5000 UNIT(S): 5000 INJECTION INTRAVENOUS; SUBCUTANEOUS at 05:29

## 2019-07-16 RX ADMIN — PIPERACILLIN AND TAZOBACTAM 25 GRAM(S): 4; .5 INJECTION, POWDER, LYOPHILIZED, FOR SOLUTION INTRAVENOUS at 21:35

## 2019-07-16 RX ADMIN — MORPHINE SULFATE 4 MILLIGRAM(S): 50 CAPSULE, EXTENDED RELEASE ORAL at 03:55

## 2019-07-16 NOTE — PROGRESS NOTE ADULT - SUBJECTIVE AND OBJECTIVE BOX
INTERVAL HPI/OVERNIGHT EVENTS:  Pt stable.   Tolerating diet.   Colostomy functioning  Ambulating    Vital Signs Last 24 Hrs  T(C): 37.6 (16 Jul 2019 05:10), Max: 37.9 (15 Jul 2019 23:47)  T(F): 99.7 (16 Jul 2019 05:10), Max: 100.2 (15 Jul 2019 23:47)  HR: 54 (16 Jul 2019 05:10) (53 - 59)  BP: 122/69 (16 Jul 2019 05:10) (98/51 - 124/79)  BP(mean): --  RR: 16 (16 Jul 2019 05:10) (16 - 18)  SpO2: 99% (16 Jul 2019 05:10) (96% - 99%)    Physical:  Abdomen: Soft nondistended, nontender.  Wound clean  Colostomy viable    I&O's Summary    15 Jul 2019 07:01  -  16 Jul 2019 07:00  --------------------------------------------------------  IN: 1600 mL / OUT: 300 mL / NET: 1300 mL        LABS:                        14.1   9.91  )-----------( 287      ( 16 Jul 2019 07:00 )             42.0             07-16    139  |  107  |  7   ----------------------------<  107<H>  3.6   |  26  |  0.69    Ca    8.0<L>      16 Jul 2019 07:00

## 2019-07-16 NOTE — PROGRESS NOTE ADULT - SUBJECTIVE AND OBJECTIVE BOX
s/p Francisca's 7/12, POD #4    Patient examined at bedside, having abdominal pain  Low grade Fever overnight  + nausea no vomiting  On Zosyn    Vital Signs Last 24 Hrs  T(C): 37.6 (16 Jul 2019 05:10), Max: 37.9 (15 Jul 2019 23:47)  T(F): 99.7 (16 Jul 2019 05:10), Max: 100.2 (15 Jul 2019 23:47)  HR: 54 (16 Jul 2019 05:10) (53 - 59)  BP: 122/69 (16 Jul 2019 05:10) (98/51 - 124/79)  BP(mean): --  RR: 16 (16 Jul 2019 05:10) (16 - 18)  SpO2: 99% (16 Jul 2019 05:10) (96% - 99%)                          14.1   9.91  )-----------( 287      ( 16 Jul 2019 07:00 )             42.0   07-16    139  |  107  |  7   ----------------------------<  107<H>  3.6   |  26  |  0.69    Ca    8.0<L>      16 Jul 2019 07:00      Physical Exam  General: AAOx3, No acute distress  Skin: No jaundice, no icterus  Abdomen: soft, incisional tenderness, distended, no rebound tenderness, no guarding, no palpable masses, midline wound open, clean, packing changed, stoma with bowel sweat, no real function  Extremities: non edematous, no calf pain bilaterally s/p Francisca's 7/12, POD #4    Patient examined at bedside, having abdominal pain  Low grade Fever overnight  no nausea no vomiting  On Zosyn    Vital Signs Last 24 Hrs  T(C): 37.6 (16 Jul 2019 05:10), Max: 37.9 (15 Jul 2019 23:47)  T(F): 99.7 (16 Jul 2019 05:10), Max: 100.2 (15 Jul 2019 23:47)  HR: 54 (16 Jul 2019 05:10) (53 - 59)  BP: 122/69 (16 Jul 2019 05:10) (98/51 - 124/79)  BP(mean): --  RR: 16 (16 Jul 2019 05:10) (16 - 18)  SpO2: 99% (16 Jul 2019 05:10) (96% - 99%)                          14.1   9.91  )-----------( 287      ( 16 Jul 2019 07:00 )             42.0   07-16    139  |  107  |  7   ----------------------------<  107<H>  3.6   |  26  |  0.69    Ca    8.0<L>      16 Jul 2019 07:00      Physical Exam  General: AAOx3, No acute distress  Skin: No jaundice, no icterus  Abdomen: soft, incisional tenderness, distended, no rebound tenderness, no guarding, no palpable masses, midline wound open, clean, packing changed, stoma with bowel sweat and some stool   Extremities: non edematous, no calf pain bilaterally

## 2019-07-16 NOTE — PROGRESS NOTE ADULT - ASSESSMENT
35 y/o male s/p Francisca's Procedure for Perforated Sigmoid Diverticulitis, with post op ileus       1. NPO  2.  out of bed  3. Continue antibiotics  4. DVT PPx  5. Daily wound care  6. hold narcotics 35 y/o male s/p Francisca's Procedure for Perforated Sigmoid Diverticulitis, with post op ileus       1.clears  2.  out of bed  3. Continue antibiotics  4. DVT PPx  5. Daily wound care  6. hold narcotics

## 2019-07-16 NOTE — PROGRESS NOTE ADULT - ASSESSMENT
Acute Perforated Diverticulitis with perforation  Peritonitis/intraabdominal abscesses - s/p drainage and washout  Fevers - improving  Leukocytosis - normalized    Plan - s/p surgery(Francisca's procedure)  cont Zosyn 3,375gms iv q8hrs  will switch to po abxs at the time of discharge.

## 2019-07-16 NOTE — PROGRESS NOTE ADULT - SUBJECTIVE AND OBJECTIVE BOX
34y Male    Meds:  piperacillin/tazobactam IVPB.. 3.375 Gram(s) IV Intermittent every 8 hours    Allergies    No Known Drug Allergies  shellfish (Unknown)    Intolerances        VITALS:  Vital Signs Last 24 Hrs  T(C): 37.1 (16 Jul 2019 13:50), Max: 37.9 (15 Jul 2019 23:47)  T(F): 98.8 (16 Jul 2019 13:50), Max: 100.2 (15 Jul 2019 23:47)  HR: 56 (16 Jul 2019 13:50) (53 - 58)  BP: 98/59 (16 Jul 2019 13:50) (98/51 - 122/69)  BP(mean): --  RR: 16 (16 Jul 2019 13:50) (16 - 16)  SpO2: 99% (16 Jul 2019 13:50) (96% - 99%)    LABS/DIAGNOSTIC TESTS:                          14.1   9.91  )-----------( 287      ( 16 Jul 2019 07:00 )             42.0         07-16    139  |  107  |  7   ----------------------------<  107<H>  3.6   |  26  |  0.69    Ca    8.0<L>      16 Jul 2019 07:00            CULTURES: .Urine  07-13 @ 22:00   No growth  --  --      .Surgical Swab  07-12 @ 19:08   Few Mixed gram negative rods "Susceptibilities not performed"  Few Alpha hemolytic strep "Susceptibilities not performed"  Few Streptococcus anginosus "Susceptibilities not performed"  Moderate Bacteroides vulgatus group "Susceptibilities not performed"  --  --      .Surgical Swab  07-12 @ 19:04   Rare Mixed gram negative rods "Susceptibilities not performed"  Few Alpha hemolytic strep "Susceptibilities not performed"  Rare Streptococcus anginosus "Susceptibilities not performed"  Few Mixed Anaerobic Kely "Susceptibilities not performed"  --  --      .Blood  07-12 @ 09:51   No growth to date.  --  --            RADIOLOGY:      ROS:  [  ] UNABLE TO ELICIT 34y Male who is doing better but is still having a lot of abdominal pain, his colostomy is functional and only had a low  grade fever today to 100.2. His peritoneal cultures are growing multiple organisms. He is on clears currently. No N or V. WBC count is WNL.    Meds:  piperacillin/tazobactam IVPB.. 3.375 Gram(s) IV Intermittent every 8 hours    Allergies    No Known Drug Allergies  shellfish (Unknown)    Intolerances        VITALS:  Vital Signs Last 24 Hrs  T(C): 37.1 (16 Jul 2019 13:50), Max: 37.9 (15 Jul 2019 23:47)  T(F): 98.8 (16 Jul 2019 13:50), Max: 100.2 (15 Jul 2019 23:47)  HR: 56 (16 Jul 2019 13:50) (53 - 58)  BP: 98/59 (16 Jul 2019 13:50) (98/51 - 122/69)  BP(mean): --  RR: 16 (16 Jul 2019 13:50) (16 - 16)  SpO2: 99% (16 Jul 2019 13:50) (96% - 99%)    LABS/DIAGNOSTIC TESTS:                          14.1   9.91  )-----------( 287      ( 16 Jul 2019 07:00 )             42.0         07-16    139  |  107  |  7   ----------------------------<  107<H>  3.6   |  26  |  0.69    Ca    8.0<L>      16 Jul 2019 07:00            CULTURES: .Urine  07-13 @ 22:00   No growth  --  --      .Surgical Swab  07-12 @ 19:08   Few Mixed gram negative rods "Susceptibilities not performed"  Few Alpha hemolytic strep "Susceptibilities not performed"  Few Streptococcus anginosus "Susceptibilities not performed"  Moderate Bacteroides vulgatus group "Susceptibilities not performed"  --  --      .Surgical Swab  07-12 @ 19:04   Rare Mixed gram negative rods "Susceptibilities not performed"  Few Alpha hemolytic strep "Susceptibilities not performed"  Rare Streptococcus anginosus "Susceptibilities not performed"  Few Mixed Anaerobic Kely "Susceptibilities not performed"  --  --      .Blood  07-12 @ 09:51   No growth to date.  --  --            RADIOLOGY:      ROS:  [  ] UNABLE TO ELICIT

## 2019-07-17 LAB
ANION GAP SERPL CALC-SCNC: 6 MMOL/L — SIGNIFICANT CHANGE UP (ref 5–17)
BUN SERPL-MCNC: 5 MG/DL — LOW (ref 7–18)
CALCIUM SERPL-MCNC: 8.1 MG/DL — LOW (ref 8.4–10.5)
CHLORIDE SERPL-SCNC: 106 MMOL/L — SIGNIFICANT CHANGE UP (ref 96–108)
CO2 SERPL-SCNC: 25 MMOL/L — SIGNIFICANT CHANGE UP (ref 22–31)
CREAT SERPL-MCNC: 0.66 MG/DL — SIGNIFICANT CHANGE UP (ref 0.5–1.3)
CULTURE RESULTS: SIGNIFICANT CHANGE UP
GLUCOSE SERPL-MCNC: 105 MG/DL — HIGH (ref 70–99)
HCT VFR BLD CALC: 42.1 % — SIGNIFICANT CHANGE UP (ref 39–50)
HGB BLD-MCNC: 14.1 G/DL — SIGNIFICANT CHANGE UP (ref 13–17)
MCHC RBC-ENTMCNC: 32.5 PG — SIGNIFICANT CHANGE UP (ref 27–34)
MCHC RBC-ENTMCNC: 33.5 GM/DL — SIGNIFICANT CHANGE UP (ref 32–36)
MCV RBC AUTO: 97 FL — SIGNIFICANT CHANGE UP (ref 80–100)
NRBC # BLD: 0 /100 WBCS — SIGNIFICANT CHANGE UP (ref 0–0)
PLATELET # BLD AUTO: 312 K/UL — SIGNIFICANT CHANGE UP (ref 150–400)
POTASSIUM SERPL-MCNC: 3.7 MMOL/L — SIGNIFICANT CHANGE UP (ref 3.5–5.3)
POTASSIUM SERPL-SCNC: 3.7 MMOL/L — SIGNIFICANT CHANGE UP (ref 3.5–5.3)
RBC # BLD: 4.34 M/UL — SIGNIFICANT CHANGE UP (ref 4.2–5.8)
RBC # FLD: 11.8 % — SIGNIFICANT CHANGE UP (ref 10.3–14.5)
SODIUM SERPL-SCNC: 137 MMOL/L — SIGNIFICANT CHANGE UP (ref 135–145)
SPECIMEN SOURCE: SIGNIFICANT CHANGE UP
SURGICAL PATHOLOGY STUDY: SIGNIFICANT CHANGE UP
WBC # BLD: 11.8 K/UL — HIGH (ref 3.8–10.5)
WBC # FLD AUTO: 11.8 K/UL — HIGH (ref 3.8–10.5)

## 2019-07-17 RX ORDER — MORPHINE SULFATE 50 MG/1
4 CAPSULE, EXTENDED RELEASE ORAL EVERY 6 HOURS
Refills: 0 | Status: DISCONTINUED | OUTPATIENT
Start: 2019-07-17 | End: 2019-07-20

## 2019-07-17 RX ADMIN — FAMOTIDINE 20 MILLIGRAM(S): 10 INJECTION INTRAVENOUS at 17:26

## 2019-07-17 RX ADMIN — Medication 30 MILLIGRAM(S): at 08:52

## 2019-07-17 RX ADMIN — Medication 30 MILLIGRAM(S): at 08:37

## 2019-07-17 RX ADMIN — HEPARIN SODIUM 5000 UNIT(S): 5000 INJECTION INTRAVENOUS; SUBCUTANEOUS at 21:08

## 2019-07-17 RX ADMIN — PIPERACILLIN AND TAZOBACTAM 25 GRAM(S): 4; .5 INJECTION, POWDER, LYOPHILIZED, FOR SOLUTION INTRAVENOUS at 06:09

## 2019-07-17 RX ADMIN — FAMOTIDINE 20 MILLIGRAM(S): 10 INJECTION INTRAVENOUS at 06:09

## 2019-07-17 RX ADMIN — HEPARIN SODIUM 5000 UNIT(S): 5000 INJECTION INTRAVENOUS; SUBCUTANEOUS at 15:40

## 2019-07-17 RX ADMIN — HEPARIN SODIUM 5000 UNIT(S): 5000 INJECTION INTRAVENOUS; SUBCUTANEOUS at 06:09

## 2019-07-17 RX ADMIN — PIPERACILLIN AND TAZOBACTAM 25 GRAM(S): 4; .5 INJECTION, POWDER, LYOPHILIZED, FOR SOLUTION INTRAVENOUS at 21:08

## 2019-07-17 RX ADMIN — PIPERACILLIN AND TAZOBACTAM 25 GRAM(S): 4; .5 INJECTION, POWDER, LYOPHILIZED, FOR SOLUTION INTRAVENOUS at 15:40

## 2019-07-17 NOTE — ADVANCED PRACTICE NURSE CONSULT - ASSESSMENT
This is a 34yr old male patient admitted for Diverticulitis, to which a consult was done for Ostomy teaching. The patient has a pink retracted stoma with moderate amounts of liquid stool in the ostomy bag and no irritation to the peristomal skin. The patient was instructed on how to identify her stomal size (2 1/4 two piece convex colostomy), empty out the colostomy bag, clean the peristomal site, apply skin prep, and apply the new ostomy bag. The patient is completely detached with the process. The patient states that a nurse will be the one to change the dressing, though it is indicated that he is the primary person for the dressing changes. The PA is aware. I will continue to monitor

## 2019-07-17 NOTE — PROGRESS NOTE ADULT - SUBJECTIVE AND OBJECTIVE BOX
s/p Francisca's 7/12, POD #5    Patient examined at bedside, having abdominal pain  no nausea no vomiting  On Zosyn  has no appetite     Vital Signs Last 24 Hrs  T(C): 37.1 (17 Jul 2019 05:36), Max: 37.7 (16 Jul 2019 21:43)  T(F): 98.8 (17 Jul 2019 05:36), Max: 99.9 (16 Jul 2019 21:43)  HR: 53 (17 Jul 2019 05:36) (53 - 60)  BP: 108/52 (17 Jul 2019 05:36) (98/59 - 115/63)  BP(mean): --  RR: 17 (17 Jul 2019 05:36) (16 - 17)  SpO2: 98% (17 Jul 2019 05:36) (97% - 99%)                          14.1   11.80 )-----------( 312      ( 17 Jul 2019 06:42 )             42.1   07-17    137  |  106  |  5<L>  ----------------------------<  105<H>  3.7   |  25  |  0.66    Ca    8.1<L>      17 Jul 2019 06:42        Physical Exam  General: AAOx3, No acute distress  Skin: No jaundice, no icterus  Abdomen: soft, incisional tenderness, distended, no rebound tenderness, no guarding, no palpable masses, midline wound open, clean, packing changed, stoma patent, + stool   Extremities: non edematous, no calf pain bilaterally

## 2019-07-17 NOTE — PROGRESS NOTE ADULT - ASSESSMENT
33 y/o male s/p Francisca's Procedure for Perforated Sigmoid Diverticulitis, with post op ileus, distended, abd pain       1.clears  2.  out of bed  3. Continue antibiotics  4. DVT PPx  5. Daily wound care  6. hold narcotics

## 2019-07-18 DIAGNOSIS — F20.0 PARANOID SCHIZOPHRENIA: ICD-10-CM

## 2019-07-18 LAB
ANION GAP SERPL CALC-SCNC: 9 MMOL/L — SIGNIFICANT CHANGE UP (ref 5–17)
BUN SERPL-MCNC: 6 MG/DL — LOW (ref 7–18)
CALCIUM SERPL-MCNC: 8.7 MG/DL — SIGNIFICANT CHANGE UP (ref 8.4–10.5)
CHLORIDE SERPL-SCNC: 106 MMOL/L — SIGNIFICANT CHANGE UP (ref 96–108)
CO2 SERPL-SCNC: 25 MMOL/L — SIGNIFICANT CHANGE UP (ref 22–31)
CREAT SERPL-MCNC: 0.75 MG/DL — SIGNIFICANT CHANGE UP (ref 0.5–1.3)
GLUCOSE SERPL-MCNC: 90 MG/DL — SIGNIFICANT CHANGE UP (ref 70–99)
HCT VFR BLD CALC: 43.7 % — SIGNIFICANT CHANGE UP (ref 39–50)
HGB BLD-MCNC: 14.5 G/DL — SIGNIFICANT CHANGE UP (ref 13–17)
MCHC RBC-ENTMCNC: 32.2 PG — SIGNIFICANT CHANGE UP (ref 27–34)
MCHC RBC-ENTMCNC: 33.2 GM/DL — SIGNIFICANT CHANGE UP (ref 32–36)
MCV RBC AUTO: 97.1 FL — SIGNIFICANT CHANGE UP (ref 80–100)
NRBC # BLD: 0 /100 WBCS — SIGNIFICANT CHANGE UP (ref 0–0)
PLATELET # BLD AUTO: 382 K/UL — SIGNIFICANT CHANGE UP (ref 150–400)
POTASSIUM SERPL-MCNC: 3.6 MMOL/L — SIGNIFICANT CHANGE UP (ref 3.5–5.3)
POTASSIUM SERPL-SCNC: 3.6 MMOL/L — SIGNIFICANT CHANGE UP (ref 3.5–5.3)
RBC # BLD: 4.5 M/UL — SIGNIFICANT CHANGE UP (ref 4.2–5.8)
RBC # FLD: 11.9 % — SIGNIFICANT CHANGE UP (ref 10.3–14.5)
SODIUM SERPL-SCNC: 140 MMOL/L — SIGNIFICANT CHANGE UP (ref 135–145)
WBC # BLD: 10.93 K/UL — HIGH (ref 3.8–10.5)
WBC # FLD AUTO: 10.93 K/UL — HIGH (ref 3.8–10.5)

## 2019-07-18 PROCEDURE — 90792 PSYCH DIAG EVAL W/MED SRVCS: CPT

## 2019-07-18 RX ORDER — HALOPERIDOL DECANOATE 100 MG/ML
5 INJECTION INTRAMUSCULAR
Refills: 0 | Status: DISCONTINUED | OUTPATIENT
Start: 2019-07-18 | End: 2019-07-20

## 2019-07-18 RX ORDER — HALOPERIDOL DECANOATE 100 MG/ML
3 INJECTION INTRAMUSCULAR EVERY 6 HOURS
Refills: 0 | Status: DISCONTINUED | OUTPATIENT
Start: 2019-07-18 | End: 2019-07-23

## 2019-07-18 RX ADMIN — HEPARIN SODIUM 5000 UNIT(S): 5000 INJECTION INTRAVENOUS; SUBCUTANEOUS at 14:24

## 2019-07-18 RX ADMIN — PIPERACILLIN AND TAZOBACTAM 25 GRAM(S): 4; .5 INJECTION, POWDER, LYOPHILIZED, FOR SOLUTION INTRAVENOUS at 22:08

## 2019-07-18 RX ADMIN — FAMOTIDINE 20 MILLIGRAM(S): 10 INJECTION INTRAVENOUS at 17:45

## 2019-07-18 RX ADMIN — FAMOTIDINE 20 MILLIGRAM(S): 10 INJECTION INTRAVENOUS at 05:13

## 2019-07-18 RX ADMIN — Medication 1 MILLIGRAM(S): at 22:08

## 2019-07-18 RX ADMIN — PIPERACILLIN AND TAZOBACTAM 25 GRAM(S): 4; .5 INJECTION, POWDER, LYOPHILIZED, FOR SOLUTION INTRAVENOUS at 14:24

## 2019-07-18 RX ADMIN — PIPERACILLIN AND TAZOBACTAM 25 GRAM(S): 4; .5 INJECTION, POWDER, LYOPHILIZED, FOR SOLUTION INTRAVENOUS at 05:13

## 2019-07-18 RX ADMIN — SODIUM CHLORIDE 125 MILLILITER(S): 9 INJECTION, SOLUTION INTRAVENOUS at 22:08

## 2019-07-18 RX ADMIN — HALOPERIDOL DECANOATE 5 MILLIGRAM(S): 100 INJECTION INTRAMUSCULAR at 18:23

## 2019-07-18 RX ADMIN — HEPARIN SODIUM 5000 UNIT(S): 5000 INJECTION INTRAVENOUS; SUBCUTANEOUS at 05:13

## 2019-07-18 RX ADMIN — HEPARIN SODIUM 5000 UNIT(S): 5000 INJECTION INTRAVENOUS; SUBCUTANEOUS at 22:08

## 2019-07-18 NOTE — BEHAVIORAL HEALTH ASSESSMENT NOTE - RISK ASSESSMENT
chronically elevated given demographics, SES and h/o schizophrenia  acute risk is CAH to hurt self  protective: denies suicidal intent or plan, feels safe and able to keep himself from hurting self, denies drug use

## 2019-07-18 NOTE — BEHAVIORAL HEALTH ASSESSMENT NOTE - HPI (INCLUDE ILLNESS QUALITY, SEVERITY, DURATION, TIMING, CONTEXT, MODIFYING FACTORS, ASSOCIATED SIGNS AND SYMPTOMS)
35 y/o undocumented immigrant Turkish speaking only M ?PPH of schizophrenia with PMH of asthma a/w perforated appendix 33 y/o undocumented immigrant Iraqi speaking only M PPH of schizophrenia recently hospitalized at [facility] for reasons unknown with PMH of asthma a/w perforated appendix.  Pt seen in private room, irritable, does not keep eye contact, keeps answering "call [facility]..they have my records"  Reports h/o schizophrenia and that he has been taking his medications but he is unsure what they are. When it is pointed out that he looks irritable and writer asks if he is in pain he says "I hear these voices and I don't feel like talking" on further encouragement he reports having been hearing voices telling him to kill himself. He says he does not feel compelled to act on these voices. They don't tell you how to kill himself. He overall feels safe. Mood is "alright" sleep is disturbed but would not elaborate "call [facility]". Denies h/o violence or recent drug use but endorses having 1-3 beers daily.     Family was contacted at [phone] with  someone picked up the phone but did not answer any questions.    [facility] was contacted and 35 y/o undocumented immigrant Singaporean speaking only M PPH of schizophrenia recently hospitalized at [facility] for reasons unknown with PMH of asthma a/w perforated diverticulitis.  Pt seen in private room, irritable, does not keep eye contact, keeps answering "call [facility]..they have my records"  Reports h/o schizophrenia and that he has been taking his medications but he is unsure what they are. When it is pointed out that he looks irritable and writer asks if he is in pain he says "I hear these voices and I don't feel like talking" on further encouragement he reports having been hearing voices telling him to kill himself. He says he does not feel compelled to act on these voices. They don't tell you how to kill himself. He overall feels safe. Mood is "alright" sleep is disturbed but would not elaborate "call [facility]". Denies h/o violence or recent drug use but endorses having 1-3 beers daily.     Family was contacted at [phone] with  someone picked up the phone but did not answer any questions.    [facility] was contacted [phone], spoke to the psych ER, the inpatient psych unit and admitting however pt was not found in their system. No prior admissions.

## 2019-07-18 NOTE — BEHAVIORAL HEALTH ASSESSMENT NOTE - SUMMARY
35 y/o undocumented immigrant Iranian speaking only M PPH of schizophrenia recently hospitalized at [facility] for reasons unknown with PMH of asthma a/w perforated diverticulitis. Presents with flat affect partially cooperative with exam reports command AH to hurt self however feels safe, not compelled to act on these voices. Can't tell about prior meds, family was not providing collateral, professional collateral was not available either.

## 2019-07-18 NOTE — BEHAVIORAL HEALTH ASSESSMENT NOTE - NSBHCHARTREVIEWVS_PSY_A_CORE FT
ICU Vital Signs Last 24 Hrs  T(C): 37 (18 Jul 2019 05:31), Max: 37.2 (17 Jul 2019 14:19)  T(F): 98.6 (18 Jul 2019 05:31), Max: 99 (17 Jul 2019 14:19)  HR: 50 (18 Jul 2019 05:31) (50 - 67)  BP: 105/56 (18 Jul 2019 05:31) (105/56 - 107/65)  BP(mean): --  ABP: --  ABP(mean): --  RR: 16 (18 Jul 2019 05:31) (16 - 16)  SpO2: 97% (18 Jul 2019 05:31) (97% - 98%)

## 2019-07-18 NOTE — PROGRESS NOTE ADULT - SUBJECTIVE AND OBJECTIVE BOX
s/p Francisca's 7/12, POD #6    Patient examined at bedside, having abdominal pain  no nausea no vomiting  On Zosyn  tolerating clears, is hungry and would like to eat     Vital Signs Last 24 Hrs  T(C): 37 (18 Jul 2019 05:31), Max: 37.2 (17 Jul 2019 14:19)  T(F): 98.6 (18 Jul 2019 05:31), Max: 99 (17 Jul 2019 14:19)  HR: 50 (18 Jul 2019 05:31) (50 - 67)  BP: 105/56 (18 Jul 2019 05:31) (105/56 - 107/65)  BP(mean): --  RR: 16 (18 Jul 2019 05:31) (16 - 16)  SpO2: 97% (18 Jul 2019 05:31) (97% - 98%)                          14.5   10.93 )-----------( 382      ( 18 Jul 2019 07:36 )             43.7   07-18    140  |  106  |  6<L>  ----------------------------<  90  3.6   |  25  |  0.75    Ca    8.7      18 Jul 2019 07:36            Physical Exam  General: AAOx3, No acute distress  Skin: No jaundice, no icterus  Abdomen: soft, incisional tenderness, distended, no rebound tenderness, no guarding, no palpable masses, midline wound open, clean, packing changed, stoma patent, + stool   Extremities: non edematous, no calf pain bilaterally

## 2019-07-18 NOTE — PROGRESS NOTE ADULT - SUBJECTIVE AND OBJECTIVE BOX
INTERVAL HPI/OVERNIGHT EVENTS:  Pt stable.   Tolerating diet.   Colostomy functioning well.    Vital Signs Last 24 Hrs  T(C): 37 (18 Jul 2019 05:31), Max: 37.2 (17 Jul 2019 14:19)  T(F): 98.6 (18 Jul 2019 05:31), Max: 99 (17 Jul 2019 14:19)  HR: 50 (18 Jul 2019 05:31) (50 - 67)  BP: 105/56 (18 Jul 2019 05:31) (105/56 - 107/65)  BP(mean): --  RR: 16 (18 Jul 2019 05:31) (16 - 16)  SpO2: 97% (18 Jul 2019 05:31) (97% - 98%)    Physical:  Abdomen: Soft nondistended, nontender.  Wound clean.  Colostomy functioning.    I&O's Summary    17 Jul 2019 07:01  -  18 Jul 2019 07:00  --------------------------------------------------------  IN: 0 mL / OUT: 50 mL / NET: -50 mL        LABS:                        14.5   10.93 )-----------( 382      ( 18 Jul 2019 07:36 )             43.7             07-18    140  |  106  |  6<L>  ----------------------------<  90  3.6   |  25  |  0.75    Ca    8.7      18 Jul 2019 07:36

## 2019-07-18 NOTE — PROGRESS NOTE ADULT - ASSESSMENT
35 y/o male s/p Francisca's Procedure for Perforated Sigmoid Diverticulitis, with post op ileus, distended, abd pain       1.clears possible advance diet   2.  out of bed  3. Continue antibiotics  4. DVT PPx  5. Daily wound care  6. hold narcotics   7. get psych eval for schizophrenia

## 2019-07-19 LAB
ANION GAP SERPL CALC-SCNC: 10 MMOL/L — SIGNIFICANT CHANGE UP (ref 5–17)
BUN SERPL-MCNC: 5 MG/DL — LOW (ref 7–18)
CALCIUM SERPL-MCNC: 8.3 MG/DL — LOW (ref 8.4–10.5)
CHLORIDE SERPL-SCNC: 106 MMOL/L — SIGNIFICANT CHANGE UP (ref 96–108)
CO2 SERPL-SCNC: 24 MMOL/L — SIGNIFICANT CHANGE UP (ref 22–31)
CREAT SERPL-MCNC: 0.73 MG/DL — SIGNIFICANT CHANGE UP (ref 0.5–1.3)
GLUCOSE SERPL-MCNC: 101 MG/DL — HIGH (ref 70–99)
HCT VFR BLD CALC: 41.7 % — SIGNIFICANT CHANGE UP (ref 39–50)
HGB BLD-MCNC: 13.8 G/DL — SIGNIFICANT CHANGE UP (ref 13–17)
MCHC RBC-ENTMCNC: 32.3 PG — SIGNIFICANT CHANGE UP (ref 27–34)
MCHC RBC-ENTMCNC: 33.1 GM/DL — SIGNIFICANT CHANGE UP (ref 32–36)
MCV RBC AUTO: 97.7 FL — SIGNIFICANT CHANGE UP (ref 80–100)
NRBC # BLD: 0 /100 WBCS — SIGNIFICANT CHANGE UP (ref 0–0)
PLATELET # BLD AUTO: 367 K/UL — SIGNIFICANT CHANGE UP (ref 150–400)
POTASSIUM SERPL-MCNC: 3.6 MMOL/L — SIGNIFICANT CHANGE UP (ref 3.5–5.3)
POTASSIUM SERPL-SCNC: 3.6 MMOL/L — SIGNIFICANT CHANGE UP (ref 3.5–5.3)
RBC # BLD: 4.27 M/UL — SIGNIFICANT CHANGE UP (ref 4.2–5.8)
RBC # FLD: 11.9 % — SIGNIFICANT CHANGE UP (ref 10.3–14.5)
SODIUM SERPL-SCNC: 140 MMOL/L — SIGNIFICANT CHANGE UP (ref 135–145)
WBC # BLD: 9.51 K/UL — SIGNIFICANT CHANGE UP (ref 3.8–10.5)
WBC # FLD AUTO: 9.51 K/UL — SIGNIFICANT CHANGE UP (ref 3.8–10.5)

## 2019-07-19 PROCEDURE — 99232 SBSQ HOSP IP/OBS MODERATE 35: CPT

## 2019-07-19 RX ADMIN — FAMOTIDINE 20 MILLIGRAM(S): 10 INJECTION INTRAVENOUS at 17:28

## 2019-07-19 RX ADMIN — Medication 30 MILLIGRAM(S): at 21:37

## 2019-07-19 RX ADMIN — HEPARIN SODIUM 5000 UNIT(S): 5000 INJECTION INTRAVENOUS; SUBCUTANEOUS at 21:31

## 2019-07-19 RX ADMIN — HEPARIN SODIUM 5000 UNIT(S): 5000 INJECTION INTRAVENOUS; SUBCUTANEOUS at 13:22

## 2019-07-19 RX ADMIN — FAMOTIDINE 20 MILLIGRAM(S): 10 INJECTION INTRAVENOUS at 06:12

## 2019-07-19 RX ADMIN — ONDANSETRON 4 MILLIGRAM(S): 8 TABLET, FILM COATED ORAL at 17:25

## 2019-07-19 RX ADMIN — Medication 1 MILLIGRAM(S): at 21:30

## 2019-07-19 RX ADMIN — Medication 30 MILLIGRAM(S): at 22:07

## 2019-07-19 RX ADMIN — HALOPERIDOL DECANOATE 5 MILLIGRAM(S): 100 INJECTION INTRAMUSCULAR at 17:25

## 2019-07-19 RX ADMIN — HALOPERIDOL DECANOATE 5 MILLIGRAM(S): 100 INJECTION INTRAMUSCULAR at 06:11

## 2019-07-19 RX ADMIN — HEPARIN SODIUM 5000 UNIT(S): 5000 INJECTION INTRAVENOUS; SUBCUTANEOUS at 06:11

## 2019-07-19 NOTE — PROGRESS NOTE ADULT - SUBJECTIVE AND OBJECTIVE BOX
s/p Francisca's 7/12, POD #7    Patient examined at bedside, having abdominal pain  no nausea no vomiting  On Zosyn  tolerating regular diet  seen by psych placed on 1: 1    Vital Signs Last 24 Hrs  T(C): 37.1 (19 Jul 2019 05:05), Max: 37.6 (18 Jul 2019 21:12)  T(F): 98.8 (19 Jul 2019 05:05), Max: 99.6 (18 Jul 2019 21:12)  HR: 54 (19 Jul 2019 05:05) (47 - 54)  BP: 104/59 (19 Jul 2019 05:05) (104/59 - 112/53)  BP(mean): --  RR: 17 (19 Jul 2019 05:05) (16 - 17)  SpO2: 98% (19 Jul 2019 05:05) (96% - 98%)                                      13.8   9.51  )-----------( 367      ( 19 Jul 2019 07:31 )             41.7   07-19    140  |  106  |  5<L>  ----------------------------<  101<H>  3.6   |  24  |  0.73    Ca    8.3<L>      19 Jul 2019 07:31              Physical Exam  General: AAOx3, No acute distress  Skin: No jaundice, no icterus  Abdomen: soft, incisional tenderness, distended, no rebound tenderness, no guarding, no palpable masses, midline wound open, clean, packing changed, stoma patent, + stool   Extremities: non edematous, no calf pain bilaterally

## 2019-07-19 NOTE — PROGRESS NOTE BEHAVIORAL HEALTH - NSBHCONSULTMEDS_PSY_A_CORE FT
start Haldol 5mg BID for psychosis  Ativan 1mg HS for insomnia most likely related to psychotic sxs c/w Haldol 5mg BID for psychosis  hold morning dose for sedation  Ativan 1mg HS for insomnia most likely related to psychotic sxs

## 2019-07-19 NOTE — PROGRESS NOTE ADULT - ASSESSMENT
35 y/o male s/p Francisca's Procedure for Perforated Sigmoid Diverticulitis, psychosis       1.Low fiber diet   2.  out of bed  3. Continue antibiotics  4. DVT PPx  5. Daily wound care  6. psych follow up   7. 1:1   8. social work consult

## 2019-07-19 NOTE — PROGRESS NOTE BEHAVIORAL HEALTH - SUMMARY
33 y/o undocumented immigrant Citizen of [place] speaking only M PPH of schizophrenia recently hospitalized at [facility] for reasons unknown with PMH of asthma a/w perforated diverticulitis. Presents with flat affect partially cooperative with exam reports command AH to hurt self however feels safe, not compelled to act on these voices. Can't tell about prior meds, family was not providing collateral, professional collateral was not available either. 35 y/o undocumented immigrant Cook Islander speaking only M PPH of schizophrenia recently hospitalized at [facility] for reasons unknown, unknown to our health system with PMH of asthma a/w perforated diverticulitis. Presents with flat affect partially cooperative with exam reports command AH to hurt self however feels safe, not compelled to act on these voices. Can't tell about prior meds, family was not providing collateral, professional collateral was not available either. Will continue CO 1:1 for now given pt reporting CAH to hurt self, presents with flat affect. yesterday he reported feeling safe despite the voices, however today he verbalizes wanting the CO 1:1 person to stay "so that I can't do something". Started on Haldol 5mg BID. No EPS or side effects so far. Will continue to follow.

## 2019-07-20 DIAGNOSIS — F06.31 MOOD DISORDER DUE TO KNOWN PHYSIOLOGICAL CONDITION WITH DEPRESSIVE FEATURES: ICD-10-CM

## 2019-07-20 LAB
ANION GAP SERPL CALC-SCNC: 7 MMOL/L — SIGNIFICANT CHANGE UP (ref 5–17)
BUN SERPL-MCNC: 6 MG/DL — LOW (ref 7–18)
CALCIUM SERPL-MCNC: 8.4 MG/DL — SIGNIFICANT CHANGE UP (ref 8.4–10.5)
CHLORIDE SERPL-SCNC: 108 MMOL/L — SIGNIFICANT CHANGE UP (ref 96–108)
CO2 SERPL-SCNC: 24 MMOL/L — SIGNIFICANT CHANGE UP (ref 22–31)
CREAT SERPL-MCNC: 0.7 MG/DL — SIGNIFICANT CHANGE UP (ref 0.5–1.3)
GLUCOSE SERPL-MCNC: 92 MG/DL — SIGNIFICANT CHANGE UP (ref 70–99)
POTASSIUM SERPL-MCNC: 4.1 MMOL/L — SIGNIFICANT CHANGE UP (ref 3.5–5.3)
POTASSIUM SERPL-SCNC: 4.1 MMOL/L — SIGNIFICANT CHANGE UP (ref 3.5–5.3)
SODIUM SERPL-SCNC: 139 MMOL/L — SIGNIFICANT CHANGE UP (ref 135–145)

## 2019-07-20 PROCEDURE — 99232 SBSQ HOSP IP/OBS MODERATE 35: CPT

## 2019-07-20 RX ORDER — PIPERACILLIN AND TAZOBACTAM 4; .5 G/20ML; G/20ML
3.38 INJECTION, POWDER, LYOPHILIZED, FOR SOLUTION INTRAVENOUS ONCE
Refills: 0 | Status: COMPLETED | OUTPATIENT
Start: 2019-07-20 | End: 2019-07-20

## 2019-07-20 RX ORDER — FAMOTIDINE 10 MG/ML
20 INJECTION INTRAVENOUS DAILY
Refills: 0 | Status: DISCONTINUED | OUTPATIENT
Start: 2019-07-20 | End: 2019-07-23

## 2019-07-20 RX ORDER — PIPERACILLIN AND TAZOBACTAM 4; .5 G/20ML; G/20ML
3.38 INJECTION, POWDER, LYOPHILIZED, FOR SOLUTION INTRAVENOUS EVERY 8 HOURS
Refills: 0 | Status: DISCONTINUED | OUTPATIENT
Start: 2019-07-20 | End: 2019-07-23

## 2019-07-20 RX ORDER — ACETAMINOPHEN 500 MG
500 TABLET ORAL EVERY 6 HOURS
Refills: 0 | Status: DISCONTINUED | OUTPATIENT
Start: 2019-07-20 | End: 2019-07-23

## 2019-07-20 RX ORDER — HALOPERIDOL DECANOATE 100 MG/ML
5 INJECTION INTRAMUSCULAR AT BEDTIME
Refills: 0 | Status: DISCONTINUED | OUTPATIENT
Start: 2019-07-20 | End: 2019-07-23

## 2019-07-20 RX ORDER — OXYCODONE AND ACETAMINOPHEN 5; 325 MG/1; MG/1
1 TABLET ORAL EVERY 6 HOURS
Refills: 0 | Status: DISCONTINUED | OUTPATIENT
Start: 2019-07-20 | End: 2019-07-23

## 2019-07-20 RX ADMIN — HEPARIN SODIUM 5000 UNIT(S): 5000 INJECTION INTRAVENOUS; SUBCUTANEOUS at 13:13

## 2019-07-20 RX ADMIN — OXYCODONE AND ACETAMINOPHEN 1 TABLET(S): 5; 325 TABLET ORAL at 18:19

## 2019-07-20 RX ADMIN — HEPARIN SODIUM 5000 UNIT(S): 5000 INJECTION INTRAVENOUS; SUBCUTANEOUS at 05:42

## 2019-07-20 RX ADMIN — HALOPERIDOL DECANOATE 5 MILLIGRAM(S): 100 INJECTION INTRAMUSCULAR at 05:42

## 2019-07-20 RX ADMIN — PIPERACILLIN AND TAZOBACTAM 200 GRAM(S): 4; .5 INJECTION, POWDER, LYOPHILIZED, FOR SOLUTION INTRAVENOUS at 17:35

## 2019-07-20 RX ADMIN — FAMOTIDINE 20 MILLIGRAM(S): 10 INJECTION INTRAVENOUS at 05:42

## 2019-07-20 RX ADMIN — OXYCODONE AND ACETAMINOPHEN 1 TABLET(S): 5; 325 TABLET ORAL at 18:40

## 2019-07-20 RX ADMIN — HEPARIN SODIUM 5000 UNIT(S): 5000 INJECTION INTRAVENOUS; SUBCUTANEOUS at 21:06

## 2019-07-20 RX ADMIN — Medication 1 MILLIGRAM(S): at 21:06

## 2019-07-20 RX ADMIN — PIPERACILLIN AND TAZOBACTAM 25 GRAM(S): 4; .5 INJECTION, POWDER, LYOPHILIZED, FOR SOLUTION INTRAVENOUS at 21:06

## 2019-07-20 RX ADMIN — FAMOTIDINE 20 MILLIGRAM(S): 10 INJECTION INTRAVENOUS at 11:14

## 2019-07-20 RX ADMIN — HALOPERIDOL DECANOATE 5 MILLIGRAM(S): 100 INJECTION INTRAMUSCULAR at 21:06

## 2019-07-20 NOTE — PROGRESS NOTE ADULT - GASTROINTESTINAL DETAILS
soft/no rigidity/no organomegaly/bowel sounds normal/no guarding
soft/no distention/no masses palpable/no rigidity/no guarding/no organomegaly

## 2019-07-20 NOTE — PROGRESS NOTE BEHAVIORAL HEALTH - SUMMARY
Pt is a  33 y/o undocumented immigrant Upper sorbian speaking only M PPH of schizophrenia recently hospitalized at [facility] for reasons unknown, unknown to our health system with PMH of asthma a/w perforated diverticulitis. Presents with flat affect, command AH to hurt self however though he reports feeling safe, he states that he feelings change from moment to moment.  For now, will continue CO 1:1 due to ongoing CAH to hurt self, flat affect. As pt has been sedated, will change to Haldol 10 mg po qhs and an additon Haldol 5mg qdaily prn. No EPS or side effects so far. Will continue to follow.

## 2019-07-20 NOTE — PROGRESS NOTE ADULT - ASSESSMENT
34M s/p Francisca's on 7/12. Ostomy functioning well, no leukocytosis.     - Continue IV zosyn, switch to PO abx at d/c (ID following)  - 1:1 obs as per psych, f/u recommendations   - Low fiber diet  - Pain control PRN  - DVT ppx  - OOB/ambulation       DW Dr. Acharya

## 2019-07-20 NOTE — PROGRESS NOTE BEHAVIORAL HEALTH - NSBHCONSULTMEDS_PSY_A_CORE FT
Haldol 10 mg po qhs  Ativan 1mg HS for insomnia most likely related to psychotic sxs Haldol 5 mg po qhs. with plan to change to haldol 10 mg po qhs tomorrow and d/c AM Haldol due to c/o sedation throughout the day.   Ativan 1mg HS for insomnia most likely related to psychotic sxs

## 2019-07-20 NOTE — PROGRESS NOTE ADULT - SUBJECTIVE AND OBJECTIVE BOX
Pt seen at bedside. No complaints. Denies abd pain, n/v. Tolerating regular diet. Colostomy functioning well. Still on 1:1 for hallucinations as per psych attending.     T(F): 98.2 (07-20-19 @ 05:56), Max: 99.6 (07-19-19 @ 20:41)  HR: 52 (07-20-19 @ 05:56) (51 - 52)  BP: 108/50 (07-20-19 @ 05:56) (102/55 - 110/56)  RR: 16 (07-20-19 @ 05:56) (16 - 16)  SpO2: 97% (07-20-19 @ 05:56) (97% - 97%)      PHYSICAL EXAM:  General: NAD, WDWN  Neuro:  Alert & oriented x 3  Lung: respirations nonlabored, good inspiratory effort  Abdomen: soft, nondistended, mild tenderness near midline incision, midline incision appears clean (packing changed) w/o malodor, purulence, or surrounding erythema. Ostomy with gas and hard stool in bag       LABS:                        13.8   9.51  )-----------( 367      ( 19 Jul 2019 07:31 )             41.7     07-19    140  |  106  |  5<L>  ----------------------------<  101<H>  3.6   |  24  |  0.73    Ca    8.3<L>      19 Jul 2019 07:31        Culture Results:   No growth (07-13 @ 22:00)

## 2019-07-20 NOTE — PROGRESS NOTE ADULT - RS GEN PE MLT RESP DETAILS PC
good air movement/no rhonchi/clear to auscultation bilaterally/no rales/no wheezes
clear to auscultation bilaterally/breath sounds equal/no rales/good air movement/no rhonchi/no wheezes

## 2019-07-20 NOTE — PROGRESS NOTE ADULT - SUBJECTIVE AND OBJECTIVE BOX
34y Male    Meds:    Allergies    No Known Drug Allergies  shellfish (Unknown)    Intolerances        VITALS:  Vital Signs Last 24 Hrs  T(C): 36.8 (20 Jul 2019 05:56), Max: 37.6 (19 Jul 2019 20:41)  T(F): 98.2 (20 Jul 2019 05:56), Max: 99.6 (19 Jul 2019 20:41)  HR: 52 (20 Jul 2019 05:56) (51 - 52)  BP: 108/50 (20 Jul 2019 05:56) (102/55 - 108/50)  BP(mean): --  RR: 16 (20 Jul 2019 05:56) (16 - 16)  SpO2: 97% (20 Jul 2019 05:56) (97% - 97%)    LABS/DIAGNOSTIC TESTS:                          13.8   9.51  )-----------( 367      ( 19 Jul 2019 07:31 )             41.7         07-20    139  |  108  |  6<L>  ----------------------------<  92  4.1   |  24  |  0.70    Ca    8.4      20 Jul 2019 12:34            CULTURES: .Urine  07-13 @ 22:00   No growth  --  --      .Surgical Swab  07-12 @ 19:08   Few Mixed gram negative rods "Susceptibilities not performed"  Few Alpha hemolytic strep "Susceptibilities not performed"  Few Streptococcus anginosus "Susceptibilities not performed"  Moderate Bacteroides vulgatus group "Susceptibilities not performed"  --  --      .Surgical Swab  07-12 @ 19:04   Rare Mixed gram negative rods "Susceptibilities not performed"  Few Alpha hemolytic strep "Susceptibilities not performed"  Rare Streptococcus anginosus "Susceptibilities not performed"  Few Mixed Anaerobic Kely "Susceptibilities not performed"  --  --      .Blood  07-12 @ 09:51   No growth at 5 days.  --  --            RADIOLOGY:      ROS:  [  ] UNABLE TO ELICIT 34y Male who is doing much better, he still has some abdominal pain but is doing much better overall, he is on 1:1 observation as per Ranjeet. His abxs lapsed since 7/18/19 and so has not gotten any abxs in the last 36 hrs, restarted now. He has no fevers or chills.    Meds:    Allergies    No Known Drug Allergies  shellfish (Unknown)    Intolerances        VITALS:  Vital Signs Last 24 Hrs  T(C): 36.8 (20 Jul 2019 05:56), Max: 37.6 (19 Jul 2019 20:41)  T(F): 98.2 (20 Jul 2019 05:56), Max: 99.6 (19 Jul 2019 20:41)  HR: 52 (20 Jul 2019 05:56) (51 - 52)  BP: 108/50 (20 Jul 2019 05:56) (102/55 - 108/50)  BP(mean): --  RR: 16 (20 Jul 2019 05:56) (16 - 16)  SpO2: 97% (20 Jul 2019 05:56) (97% - 97%)    LABS/DIAGNOSTIC TESTS:                          13.8   9.51  )-----------( 367      ( 19 Jul 2019 07:31 )             41.7         07-20    139  |  108  |  6<L>  ----------------------------<  92  4.1   |  24  |  0.70    Ca    8.4      20 Jul 2019 12:34            CULTURES: .Urine  07-13 @ 22:00   No growth  --  --      .Surgical Swab  07-12 @ 19:08   Few Mixed gram negative rods "Susceptibilities not performed"  Few Alpha hemolytic strep "Susceptibilities not performed"  Few Streptococcus anginosus "Susceptibilities not performed"  Moderate Bacteroides vulgatus group "Susceptibilities not performed"  --  --      .Surgical Swab  07-12 @ 19:04   Rare Mixed gram negative rods "Susceptibilities not performed"  Few Alpha hemolytic strep "Susceptibilities not performed"  Rare Streptococcus anginosus "Susceptibilities not performed"  Few Mixed Anaerobic Kely "Susceptibilities not performed"  --  --      .Blood  07-12 @ 09:51   No growth at 5 days.  --  --            RADIOLOGY:      ROS:  [  ] UNABLE TO ELICIT

## 2019-07-20 NOTE — PROGRESS NOTE BEHAVIORAL HEALTH - OTHER
not assessed flat can't rule out delusions, intermittent passive SI - no current intent or plan to harm self. unable to assess

## 2019-07-20 NOTE — PROGRESS NOTE ADULT - ASSESSMENT
Acute Perforated Diverticulitis with perforation  Peritonitis/intraabdominal abscesses - s/p drainage and washout  Fevers - resolved  Leukocytosis - normalized    Plan -   restart Zosyn 3,375gms iv q8hrs  when ready to go home will switch to ceftin and flagyl po

## 2019-07-21 LAB
HCT VFR BLD CALC: 42.6 % — SIGNIFICANT CHANGE UP (ref 39–50)
HGB BLD-MCNC: 13.9 G/DL — SIGNIFICANT CHANGE UP (ref 13–17)
MCHC RBC-ENTMCNC: 32.6 GM/DL — SIGNIFICANT CHANGE UP (ref 32–36)
MCHC RBC-ENTMCNC: 32.6 PG — SIGNIFICANT CHANGE UP (ref 27–34)
MCV RBC AUTO: 99.8 FL — SIGNIFICANT CHANGE UP (ref 80–100)
NRBC # BLD: 0 /100 WBCS — SIGNIFICANT CHANGE UP (ref 0–0)
PLATELET # BLD AUTO: 372 K/UL — SIGNIFICANT CHANGE UP (ref 150–400)
RBC # BLD: 4.27 M/UL — SIGNIFICANT CHANGE UP (ref 4.2–5.8)
RBC # FLD: 11.8 % — SIGNIFICANT CHANGE UP (ref 10.3–14.5)
WBC # BLD: 10.3 K/UL — SIGNIFICANT CHANGE UP (ref 3.8–10.5)
WBC # FLD AUTO: 10.3 K/UL — SIGNIFICANT CHANGE UP (ref 3.8–10.5)

## 2019-07-21 PROCEDURE — 99232 SBSQ HOSP IP/OBS MODERATE 35: CPT

## 2019-07-21 RX ADMIN — FAMOTIDINE 20 MILLIGRAM(S): 10 INJECTION INTRAVENOUS at 12:41

## 2019-07-21 RX ADMIN — HEPARIN SODIUM 5000 UNIT(S): 5000 INJECTION INTRAVENOUS; SUBCUTANEOUS at 06:05

## 2019-07-21 RX ADMIN — HEPARIN SODIUM 5000 UNIT(S): 5000 INJECTION INTRAVENOUS; SUBCUTANEOUS at 22:44

## 2019-07-21 RX ADMIN — HALOPERIDOL DECANOATE 5 MILLIGRAM(S): 100 INJECTION INTRAMUSCULAR at 22:44

## 2019-07-21 RX ADMIN — HEPARIN SODIUM 5000 UNIT(S): 5000 INJECTION INTRAVENOUS; SUBCUTANEOUS at 13:55

## 2019-07-21 RX ADMIN — PIPERACILLIN AND TAZOBACTAM 25 GRAM(S): 4; .5 INJECTION, POWDER, LYOPHILIZED, FOR SOLUTION INTRAVENOUS at 06:05

## 2019-07-21 RX ADMIN — PIPERACILLIN AND TAZOBACTAM 25 GRAM(S): 4; .5 INJECTION, POWDER, LYOPHILIZED, FOR SOLUTION INTRAVENOUS at 22:44

## 2019-07-21 RX ADMIN — Medication 1 MILLIGRAM(S): at 22:44

## 2019-07-21 RX ADMIN — PIPERACILLIN AND TAZOBACTAM 25 GRAM(S): 4; .5 INJECTION, POWDER, LYOPHILIZED, FOR SOLUTION INTRAVENOUS at 13:55

## 2019-07-21 NOTE — PROGRESS NOTE ADULT - SUBJECTIVE AND OBJECTIVE BOX
Seen at bedside. Tolerating diet. No f/c, n/v, abd pain. Colostomy continues to function well.  Continues to be on 1:1    T(F): 98.6 (07-21-19 @ 06:42), Max: 98.7 (07-20-19 @ 14:00)  HR: 57 (07-21-19 @ 06:42) (50 - 568)  BP: 104/48 (07-21-19 @ 06:42) (97/47 - 107/54)  RR: 17 (07-21-19 @ 06:42) (16 - 17)  SpO2: 99% (07-21-19 @ 06:42) (97% - 99%)      PHYSICAL EXAM:  General: NAD, flat affect  Neuro:  Alert & oriented x 3  Lung: respirations nonlabored, good inspiratory effort  Abdomen: soft, NTND. Ostomy with gas and hard stool. Midline incision clean, no erythema or drainage, repacked w/ WTD dressing.      LABS:                        13.9   10.30 )-----------( 372      ( 21 Jul 2019 06:09 )             42.6     07-20    139  |  108  |  6<L>  ----------------------------<  92  4.1   |  24  |  0.70    Ca    8.4      20 Jul 2019 12:34

## 2019-07-21 NOTE — PROGRESS NOTE BEHAVIORAL HEALTH - OTHER
not assessed flat can't rule out delusions, intermittent passive SI though he denies their presence today- no current intent or plan to harm self. unable to assess

## 2019-07-21 NOTE — PROGRESS NOTE BEHAVIORAL HEALTH - NSBHFUPINTERVALHXFT_PSY_A_CORE
Pt seen and examined. Chart reviewed. Pt is sedated, however is easily arousable. He reports that he slept well and notes that his mood has improved. He denies AH today and feels more hopeful than yesterday. He does admit that his feelings do vary intermittently, however he notes overall feeling safer today than any other day he has been in the hospital.  He notes that appetite has also been good and that pain though present is also improved today.
Pt seen and examined. Chart reviewed.  PCA - staff at bedside and pts RN report that pt has been very depressed and sleeping the majority of the time.  Upon evaluation, pt is sleeping, but easily arousable.  He admits to feelings of depression and frustration related to his medical situation. States that he was working as a  and knows his job will not be waiting for him. He admits that his mood is "up and down." Today, though he states that he feels better at this moment - he notes that his mood is variable. States that when pain is present -AH is powerful and he feels like hurting himself and ending his life. He denies any hx of SA/NSSIB.  He states that he doesn't trust himself at this time.   He states that he is originally from [place] and has been in the  x 12 years. He states that he resides with his parents and brother - never , no children.   States that he was recently in [facility] for psychiatric admission 5 or 6 months ago - he did not elaborate on cause of admission - states that he was treated with Risperdal, however admits that he did not follow up with treatment upon discharge.
pt did not PRNs, slept through the night  still sleepy this AM, speaks very soft  has hard time understanding what he is saying. keeps eyes closed.   Interviewed with #[id number] reports the voices are still there "once in a while" when asked if he feels safe and options about CO 1:1 he says "let her stay..so I am not able to do anything"

## 2019-07-21 NOTE — PROGRESS NOTE BEHAVIORAL HEALTH - SUMMARY
Pt is a 35 y/o undocumented immigrant Icelandic speaking only M PPH of schizophrenia recently hospitalized at [facility] for reasons unknown, unknown to our health system with PMH of asthma a/w perforated diverticulitis. Presents with flat affect, command AH to hurt self however though he reports feeling safe at this time and notes that CAH have not been present today.   For now, will continue CO 1:1 through the weekend, with plan to d/c on Monday if pt remains stable.  If pt is no longer endorsing CAH to hurt self and if mood remains improved may be able to d/c to outpt follow up vs inpt psychiatric hospitalization.  For now will continue haldol 5 mg po qhs and add Haldol 5mg qdaily prn as pt appeared to very sedated on Haldol 5 mg po bid, if additional dose is ultimately needed, would give at night. No EPS or side effects so far. Will continue to follow.

## 2019-07-21 NOTE — PROGRESS NOTE ADULT - ASSESSMENT
34M w/ sigmoid diverticulitis w/ microperf & phlegmon s/p Hakan 7/12    - Continue IV abx  - Low fiber diet   - Pain control PRN  - 1:1 obs, on haldol as per psych   - OOB/ambulation

## 2019-07-21 NOTE — PROGRESS NOTE BEHAVIORAL HEALTH - NSBHFUPSUICINTERVALFT_PSY_A_CORE
today is the first day that he has denies SI, though he reports that his feelings vary from time to time
intermittent passive SI present as a result of pain and CAH

## 2019-07-21 NOTE — PROGRESS NOTE BEHAVIORAL HEALTH - NSBHCHARTREVIEWVS_PSY_A_CORE FT
HR 57 /48 RR 17 T 98.6 Sp02 - 99%
ICU Vital Signs Last 24 Hrs  T(C): 37.1 (19 Jul 2019 05:05), Max: 37.6 (18 Jul 2019 21:12)  T(F): 98.8 (19 Jul 2019 05:05), Max: 99.6 (18 Jul 2019 21:12)  HR: 54 (19 Jul 2019 05:05) (47 - 54)  BP: 104/59 (19 Jul 2019 05:05) (104/59 - 112/53)  BP(mean): --  ABP: --  ABP(mean): --  RR: 17 (19 Jul 2019 05:05) (16 - 17)  SpO2: 98% (19 Jul 2019 05:05) (96% - 98%)
HR 52 /50 RR 16 T 98.2 Sp02 - 97

## 2019-07-21 NOTE — PROGRESS NOTE BEHAVIORAL HEALTH - NSBHCONSULTMEDSEVERE_PSY_A_CORE FT
Haldol 5mg +Ativan 2mg IV q6 for severe agitation
Haldol 5mg +Ativan 2mg PO q6 for severe agitation
Haldol 5 mg /Ativan 2mg/ Benadryl 50 mg po/im/iv q6hrs prn.

## 2019-07-21 NOTE — PROGRESS NOTE BEHAVIORAL HEALTH - RISK ASSESSMENT
chronically elevated given demographics, SES and h/o schizophrenia  acute risk is CAH to hurt self  protective: denies suicidal intent or plan, feels safe and able to keep himself from hurting self, denies drug use
chronically elevated given demographics, SES and h/o schizophrenia
chronically elevated given demographics, SES and h/o schizophrenia  acute risk is CAH to hurt self  protective: denies suicidal intent or plan, feels safe and able to keep himself from hurting self, denies drug use.

## 2019-07-21 NOTE — PROGRESS NOTE BEHAVIORAL HEALTH - DETAILS
intermittent pain related to recent surgery - but overall better today CAH to hurt himself, however he denies this today

## 2019-07-22 RX ORDER — HALOPERIDOL DECANOATE 100 MG/ML
1 INJECTION INTRAMUSCULAR
Qty: 30 | Refills: 0
Start: 2019-07-22 | End: 2019-08-20

## 2019-07-22 RX ORDER — TRAMADOL HYDROCHLORIDE 50 MG/1
1 TABLET ORAL
Qty: 20 | Refills: 0
Start: 2019-07-22 | End: 2019-07-26

## 2019-07-22 RX ORDER — METRONIDAZOLE 500 MG
1 TABLET ORAL
Qty: 18 | Refills: 0
Start: 2019-07-22 | End: 2019-07-27

## 2019-07-22 RX ORDER — CEFUROXIME AXETIL 250 MG
1 TABLET ORAL
Qty: 12 | Refills: 0
Start: 2019-07-22 | End: 2019-07-27

## 2019-07-22 RX ADMIN — HEPARIN SODIUM 5000 UNIT(S): 5000 INJECTION INTRAVENOUS; SUBCUTANEOUS at 13:25

## 2019-07-22 RX ADMIN — FAMOTIDINE 20 MILLIGRAM(S): 10 INJECTION INTRAVENOUS at 11:24

## 2019-07-22 RX ADMIN — HEPARIN SODIUM 5000 UNIT(S): 5000 INJECTION INTRAVENOUS; SUBCUTANEOUS at 22:29

## 2019-07-22 RX ADMIN — Medication 1 MILLIGRAM(S): at 22:33

## 2019-07-22 RX ADMIN — PIPERACILLIN AND TAZOBACTAM 25 GRAM(S): 4; .5 INJECTION, POWDER, LYOPHILIZED, FOR SOLUTION INTRAVENOUS at 05:05

## 2019-07-22 RX ADMIN — PIPERACILLIN AND TAZOBACTAM 25 GRAM(S): 4; .5 INJECTION, POWDER, LYOPHILIZED, FOR SOLUTION INTRAVENOUS at 22:29

## 2019-07-22 RX ADMIN — PIPERACILLIN AND TAZOBACTAM 25 GRAM(S): 4; .5 INJECTION, POWDER, LYOPHILIZED, FOR SOLUTION INTRAVENOUS at 13:25

## 2019-07-22 RX ADMIN — HALOPERIDOL DECANOATE 5 MILLIGRAM(S): 100 INJECTION INTRAMUSCULAR at 22:29

## 2019-07-22 RX ADMIN — HEPARIN SODIUM 5000 UNIT(S): 5000 INJECTION INTRAVENOUS; SUBCUTANEOUS at 05:05

## 2019-07-22 NOTE — PROGRESS NOTE ADULT - ASSESSMENT
34M w/ sigmoid diverticulitis w/ microperf & phlegmon s/p Hakan 7/12    - Continue IV abx  - Low fiber diet   - Pain control PRN  - 1:1 obs, on haldol as per psych, awaiting psych clearance   - OOB/ambulation  - social work follow up   - will need to teach family how  to care for ostomy

## 2019-07-22 NOTE — CHART NOTE - NSCHARTNOTEFT_GEN_A_CORE
Assessment:   Patient is a 34y old  Male who presents with a chief complaint of Complicated sigmoid diverticulitis (2019 07:45). Pt noted as tolerating diet with functioning colostomy. Pt seen, accepted diet ed/ copy: Colostomy Nutrition Therapy ([place]). Declined  phone.      Factors impacting intake: [ ] none [ ] nausea  [ ] vomiting [ ] diarrhea [ ] constipation  [ ]chewing problems [ ] swallowing issues  [ ] other:     Diet Prescription: Diet, Low Fiber (19 @ 10:29)    Intake:     Daily Height in cm: 165.1 (2019 20:41)      Daily Weight in k.8 (15 Jul 2019 16:17)    % Weight Change    Pertinent Medications: MEDICATIONS  (STANDING):  famotidine    Tablet 20 milliGRAM(s) Oral daily  haloperidol     Tablet 5 milliGRAM(s) Oral at bedtime  heparin  Injectable 5000 Unit(s) SubCutaneous every 8 hours  LORazepam     Tablet 1 milliGRAM(s) Oral at bedtime  piperacillin/tazobactam IVPB.. 3.375 Gram(s) IV Intermittent every 8 hours    MEDICATIONS  (PRN):  acetaminophen   Tablet .. 500 milliGRAM(s) Oral every 6 hours PRN Mild Pain (1 - 3)  ALBUTerol    90 MICROgram(s) HFA Inhaler 1 Puff(s) Inhalation four times a day PRN Shortness of Breath and/or Wheezing  haloperidol    Injectable 3 milliGRAM(s) IV Push every 6 hours PRN severe agitation  ipratropium 17 MICROgram(s) HFA Inhaler 1 Puff(s) Inhalation four times a day PRN SOB or wheezing  LORazepam   Injectable 1 milliGRAM(s) IV Push every 6 hours PRN severe agitation  ondansetron Injectable 4 milliGRAM(s) IV Push every 6 hours PRN Nausea and/or Vomiting  oxyCODONE    5 mG/acetaminophen 325 mG 1 Tablet(s) Oral every 6 hours PRN Moderate Pain (4 - 6)    Pertinent Labs:  Na139 mmol/L Glu 92 mg/dL K+ 4.1 mmol/L Cr  0.70 mg/dL BUN 6 mg/dL<L>     CAPILLARY BLOOD GLUCOSE        Skin:     Estimated Needs:   [ ] no change since previous assessment  [ ] recalculated:       Previous Nutrition Diagnosis:   [ ] Altered GI function  [x ]Inadequate Oral Intake [ ] Swallowing Difficulty   [ ] Altered nutrition related labs [ ] Increased Nutrient Needs [ ] Overweight/Obesity   [ ] Unintended Weight Loss [ ] Food & Nutrition Related Knowledge Deficit [ ] Malnutrition   [ ] Other:     Nutrition Diagnosis is [ ] ongoing  [x ] resolved [ ] not applicable     New Nutrition Diagnosis: [ ] not applicable       Interventions:   Recommend  [ ] Change Diet To:  [ ] Nutrition Supplement  [ ] Nutrition Support  [x ] Other: MD to monitor. RD available.     Monitoring and Evaluation:   [x ] PO intake [ x ] Tolerance to diet prescription [ x ] weights [ x ] labs[ x ] follow up per protocol  [ ] other:

## 2019-07-22 NOTE — PROGRESS NOTE BEHAVIORAL HEALTH - AXIS III
diverticulitis, s/p surgical procedure

## 2019-07-22 NOTE — PROGRESS NOTE BEHAVIORAL HEALTH - NSBHFUPREASONCONS_PSY_A_CORE
depression/med management/psychosis
psychosis
psychosis/med management/depression/suicidality
psychosis

## 2019-07-22 NOTE — PROGRESS NOTE BEHAVIORAL HEALTH - NSBHFUPINTERVALCCFT_PSY_A_CORE
"I feel better,sleeping good, no more voices"
n/a
n/a
35 y/o undocumented immigrant [place]er speaking only M PPH of schizophrenia recently hospitalized at [facility] for reasons unknown with PMH of asthma a/w perforated diverticulitis, reported CAH to hurt self, was started on Haldol, tolerating well so far

## 2019-07-22 NOTE — PROGRESS NOTE BEHAVIORAL HEALTH - SUMMARY
33 y/o man with PMH of asthma c/o abdominal pain for 1 x day. Pain sharp, diffuse, but more intense in LLQ/RLQ and suprapubic areas. Pt also felt nauseous and had 2-3 episodes of vomiting. Pt also c/o fever and urinary retention. Pt denies diarrhea, dysuria, fecaluria, CP, SOB, wheezing, dizziness or hx of similar symptoms in past.  WBC 11  CT abd/pel: complicated sigmoid diverticulitis with microperforation, phlegmon/abscess, loculated fluid in pelvis and questionable colovesical fistula (12 Jul 2019 04:06)  Patient with h/o past psych. inpatient Tx. due to "hearing voices".  Patient is s/p partial colon resection due to perforated diverticulitis.  Patient was off psych .meds.  Denied h/o suicidal attempts.  Denied using alcohol or illegal drugs.  Stated working as a .  Lives with roommates.  Patient is currently on Haldol 5mg po qhs and Ativan 1mg po qhs.  Patient stated feeling -"better".  Patient is a/o x3, cooperative verbal, behaviorally controlloed, pleasant.  Speech is goal directed logical.  Mood-"OK".Denied hearing voices.Denied a/v hallucinations.  Denied s/h thought.Memory and cognition-fair.I&J-fair.

## 2019-07-22 NOTE — PROGRESS NOTE BEHAVIORAL HEALTH - NSBHCONSULTOBSREASON_PSY_A_CORE FT
CAH to hurt self
d/c 1:1 obs.
for safety, support, supervision due to recent CAH to harm self.
due to c/o depression and CAH to harm self.

## 2019-07-22 NOTE — PROGRESS NOTE ADULT - SUBJECTIVE AND OBJECTIVE BOX
Seen at bedside. Tolerating diet. No f/c, n/v, abd pain. Colostomy continues to function well.  Continues to be on 1:1    Vital Signs Last 24 Hrs  T(C): 37.2 (22 Jul 2019 05:16), Max: 37.2 (22 Jul 2019 05:16)  T(F): 98.9 (22 Jul 2019 05:16), Max: 98.9 (22 Jul 2019 05:16)  HR: 50 (22 Jul 2019 05:16) (50 - 52)  BP: 100/50 (22 Jul 2019 05:16) (100/50 - 108/52)  BP(mean): --  RR: 18 (22 Jul 2019 05:16) (18 - 18)  SpO2: 97% (22 Jul 2019 05:16) (97% - 97%)    PHYSICAL EXAM:  General: NAD, flat affect  Neuro:  Alert & oriented x 3  Lung: respirations nonlabored, good inspiratory effort  Abdomen: soft, NTND. Ostomy with gas and hard stool. Midline incision clean, no erythema or drainage, repacked w/ WTD dressing.      LABS:                                      13.9   10.30 )-----------( 372      ( 21 Jul 2019 06:09 )             42.6   07-20    139  |  108  |  6<L>  ----------------------------<  92  4.1   |  24  |  0.70    Ca    8.4      20 Jul 2019 12:34

## 2019-07-23 ENCOUNTER — TRANSCRIPTION ENCOUNTER (OUTPATIENT)
Age: 34
End: 2019-07-23

## 2019-07-23 VITALS
SYSTOLIC BLOOD PRESSURE: 120 MMHG | TEMPERATURE: 99 F | RESPIRATION RATE: 18 BRPM | HEART RATE: 61 BPM | OXYGEN SATURATION: 97 % | DIASTOLIC BLOOD PRESSURE: 71 MMHG

## 2019-07-23 PROCEDURE — 80048 BASIC METABOLIC PNL TOTAL CA: CPT

## 2019-07-23 PROCEDURE — 74177 CT ABD & PELVIS W/CONTRAST: CPT

## 2019-07-23 PROCEDURE — 96375 TX/PRO/DX INJ NEW DRUG ADDON: CPT

## 2019-07-23 PROCEDURE — 87070 CULTURE OTHR SPECIMN AEROBIC: CPT

## 2019-07-23 PROCEDURE — 87040 BLOOD CULTURE FOR BACTERIA: CPT

## 2019-07-23 PROCEDURE — 99285 EMERGENCY DEPT VISIT HI MDM: CPT | Mod: 25

## 2019-07-23 PROCEDURE — 83690 ASSAY OF LIPASE: CPT

## 2019-07-23 PROCEDURE — 88307 TISSUE EXAM BY PATHOLOGIST: CPT

## 2019-07-23 PROCEDURE — 93005 ELECTROCARDIOGRAM TRACING: CPT

## 2019-07-23 PROCEDURE — 96374 THER/PROPH/DIAG INJ IV PUSH: CPT

## 2019-07-23 PROCEDURE — 81001 URINALYSIS AUTO W/SCOPE: CPT

## 2019-07-23 PROCEDURE — 36415 COLL VENOUS BLD VENIPUNCTURE: CPT

## 2019-07-23 PROCEDURE — 87086 URINE CULTURE/COLONY COUNT: CPT

## 2019-07-23 PROCEDURE — 82962 GLUCOSE BLOOD TEST: CPT

## 2019-07-23 PROCEDURE — 80307 DRUG TEST PRSMV CHEM ANLYZR: CPT

## 2019-07-23 PROCEDURE — 85027 COMPLETE CBC AUTOMATED: CPT

## 2019-07-23 PROCEDURE — 86850 RBC ANTIBODY SCREEN: CPT

## 2019-07-23 PROCEDURE — 87075 CULTR BACTERIA EXCEPT BLOOD: CPT

## 2019-07-23 PROCEDURE — 86900 BLOOD TYPING SEROLOGIC ABO: CPT

## 2019-07-23 PROCEDURE — 80053 COMPREHEN METABOLIC PANEL: CPT

## 2019-07-23 PROCEDURE — 86901 BLOOD TYPING SEROLOGIC RH(D): CPT

## 2019-07-23 PROCEDURE — C1889: CPT

## 2019-07-23 RX ADMIN — PIPERACILLIN AND TAZOBACTAM 25 GRAM(S): 4; .5 INJECTION, POWDER, LYOPHILIZED, FOR SOLUTION INTRAVENOUS at 05:31

## 2019-07-23 RX ADMIN — PIPERACILLIN AND TAZOBACTAM 25 GRAM(S): 4; .5 INJECTION, POWDER, LYOPHILIZED, FOR SOLUTION INTRAVENOUS at 13:19

## 2019-07-23 RX ADMIN — FAMOTIDINE 20 MILLIGRAM(S): 10 INJECTION INTRAVENOUS at 13:19

## 2019-07-23 RX ADMIN — HEPARIN SODIUM 5000 UNIT(S): 5000 INJECTION INTRAVENOUS; SUBCUTANEOUS at 05:31

## 2019-07-23 RX ADMIN — HEPARIN SODIUM 5000 UNIT(S): 5000 INJECTION INTRAVENOUS; SUBCUTANEOUS at 13:19

## 2019-07-23 NOTE — DISCHARGE NOTE PROVIDER - CARE PROVIDERS DIRECT ADDRESSES
[email or web address]

## 2019-07-23 NOTE — ADVANCED PRACTICE NURSE CONSULT - ASSESSMENT
This is a 34yr old male patient admitted for Diverticulitis, to which a consult was done for Ostomy teaching. There have been attempts made/scheduled to teach the patients family how to do the dressing changes; but they have been absent. The patient is still not an active participant to the process and neither is the girlfriend (as stated by the patient). The NP and RN is made aware and prior to discharge today, if the family is present, the nurse will reinforce the procedure of emptying and changing the pouch, that the CWOCN educated the patient on.

## 2019-07-23 NOTE — DISCHARGE NOTE PROVIDER - HOSPITAL COURSE
35 y/o man with PMH of asthma c/o abdominal pain for 1 x day. Pain sharp, diffuse, but more intense in LLQ/RLQ and suprapubic areas. Pt also felt nauseous and had 2-3 episodes of vomiting. Pt also c/o fever and urinary retention. Pt denies diarrhea, dysuria, fecaluria, CP, SOB, wheezing, dizziness or hx of similar symptoms in past.    WBC 11    CT abd/pel: complicated sigmoid diverticulitis with microperforation, phlegmon/abscess, loculated fluid in pelvis and questionable colovesical fistula (12 Jul 2019 04:06)        Patient was taken to the OR on 7/12 and underwent Francisca procedure for perforated diverticulitis and intraabdominal abscesses/peritonitis, he had a washout of his abdomen. patient tolerated procedure well. Post operative course complicated by psychosis. patient was seen by psych and managed for psychosis. Family was taught about ostomy and wound care. patient for d/c home with [facility] homecare, oral antibiotics, medication of schizophrenia, and outpatient followup at [place]

## 2019-07-23 NOTE — DISCHARGE NOTE PROVIDER - CARE PROVIDER_API CALL
Preet Alves)  Surgery  [address] [place] [address], [place] [address]  Phone: [phone]  Fax: [phone]  Follow Up Time: 1 week

## 2019-07-23 NOTE — PROGRESS NOTE ADULT - SUBJECTIVE AND OBJECTIVE BOX
Seen at bedside. Tolerating diet. No f/c, n/v, abd pain. Colostomy continues to function well.  cleared from psych standpoint. Patients mother to come in today to learn ostomy care     Vital Signs Last 24 Hrs  T(C): 36.6 (23 Jul 2019 06:22), Max: 37.2 (22 Jul 2019 22:59)  T(F): 97.9 (23 Jul 2019 06:22), Max: 99 (22 Jul 2019 22:59)  HR: 56 (23 Jul 2019 06:22) (52 - 99)  BP: 96/51 (23 Jul 2019 06:22) (96/51 - 109/61)  BP(mean): --  RR: 16 (23 Jul 2019 06:22) (16 - 18)  SpO2: 97% (23 Jul 2019 06:22) (97% - 98%)    PHYSICAL EXAM:  General: NAD, flat affect  Neuro:  Alert & oriented x 3  Lung: respirations nonlabored, good inspiratory effort  Abdomen: soft, NTND. Ostomy with gas and hard stool. Midline incision clean, no erythema or drainage, repacked w/ WTD dressing.      LABS:

## 2019-07-23 NOTE — DISCHARGE NOTE NURSING/CASE MANAGEMENT/SOCIAL WORK - NSDCDPATPORTLINK_GEN_ALL_CORE
You can access the [facility] Patient Portal, offered by [facility], by registering with the following website: [email or web address]

## 2019-07-23 NOTE — DISCHARGE NOTE NURSING/CASE MANAGEMENT/SOCIAL WORK - NSDCFUADDAPPT_GEN_ALL_CORE_FT
-- [facility], Mental health: [address] [place], [place], [address], [phone], 07/25/2019, 10:00 AM    -- [facility]:[address] [place] [address], [id number], [place] [address], 2nd floor, 7/26/2019, 3:00 PM with Dr. Diogo Mccord.

## 2019-07-23 NOTE — PROGRESS NOTE ADULT - REASON FOR ADMISSION
Complicated sigmoid diverticulitis

## 2019-07-23 NOTE — PROGRESS NOTE ADULT - ASSESSMENT
34M w/ sigmoid diverticulitis w/ microperf & phlegmon s/p Hakan 7/12    - Continue IV abx  - Low fiber diet   - Pain control PRN  - OOB/ambulation  - social work follow up   - will need to teach family how  to care for ostomy tentatively today

## 2019-07-23 NOTE — PROGRESS NOTE ADULT - PROVIDER SPECIALTY LIST ADULT
Infectious Disease
Infectious Disease
Surgery

## 2019-07-23 NOTE — DISCHARGE NOTE PROVIDER - NSDCCPCAREPLAN_GEN_ALL_CORE_FT
PRINCIPAL DISCHARGE DIAGNOSIS  Diagnosis: Diverticulitis  Assessment and Plan of Treatment: Please care for ostomy daily   diet as tolerated   Please follow up with dr. martinez within 1 week   continue oral antibiotics for 6 days      SECONDARY DISCHARGE DIAGNOSES  Diagnosis: Paranoid schizophrenia  Assessment and Plan of Treatment: continue to take haldol and ativan and follow up with [facility]

## 2019-07-30 PROBLEM — Z86.59 HISTORY OF PSYCHOSIS: Status: RESOLVED | Noted: 2019-07-30 | Resolved: 2019-07-30

## 2019-07-30 PROBLEM — Z00.00 ENCOUNTER FOR PREVENTIVE HEALTH EXAMINATION: Status: ACTIVE | Noted: 2019-07-30

## 2019-07-30 PROBLEM — Z86.59 PERSONAL HISTORY OF SCHIZOPHRENIA: Status: RESOLVED | Noted: 2019-07-30 | Resolved: 2019-07-30

## 2019-07-30 PROBLEM — Z87.09 HISTORY OF ASTHMA: Status: RESOLVED | Noted: 2019-07-30 | Resolved: 2019-07-30

## 2019-07-30 PROBLEM — Z78.9 SOCIAL ALCOHOL USE: Status: ACTIVE | Noted: 2019-07-30

## 2019-08-01 ENCOUNTER — APPOINTMENT (OUTPATIENT)
Dept: SURGERY | Facility: CLINIC | Age: 34
End: 2019-08-01
Payer: MEDICAID

## 2019-08-01 VITALS
DIASTOLIC BLOOD PRESSURE: 82 MMHG | HEART RATE: 78 BPM | SYSTOLIC BLOOD PRESSURE: 130 MMHG | HEIGHT: 66 IN | OXYGEN SATURATION: 98 % | TEMPERATURE: 98.3 F

## 2019-08-01 DIAGNOSIS — Z86.59 PERSONAL HISTORY OF OTHER MENTAL AND BEHAVIORAL DISORDERS: ICD-10-CM

## 2019-08-01 DIAGNOSIS — Z78.9 OTHER SPECIFIED HEALTH STATUS: ICD-10-CM

## 2019-08-01 DIAGNOSIS — Z87.09 PERSONAL HISTORY OF OTHER DISEASES OF THE RESPIRATORY SYSTEM: ICD-10-CM

## 2019-08-01 PROCEDURE — 99024 POSTOP FOLLOW-UP VISIT: CPT

## 2019-08-01 NOTE — DATA REVIEWED
[FreeTextEntry1] : DANISHA COOPER                    1\par \par \par \par Surgical Final Report\par \par \par \par \par Final Diagnosis\par Sigmoid colon; partial resection:\par Perforating diverticulitis with extensive pericolic inflammation,\par abscesses and hemorrhage\par \par Verified by: Shayy Roy M.D.\par (Electronic Signature)\par Reported on: 07/17/19 12:37 EDT, [address], [place],\[place] [address]\par _________________________________________________________________\par \par Clinical History\par Perforated diverticulitis\par Exploratory laparotomy\par Sigmoid colon resection\par \par Specimen(s) Submitted\par Portion of sigmoid colon\par

## 2019-08-01 NOTE — PHYSICAL EXAM
[Abdominal Masses] : No abdominal masses [Abdomen Tenderness] : ~T ~M No abdominal tenderness [Calm] : calm [de-identified] : Surgical wounds are  healing well. No signs of inflammation, infection or exudate.  Stoma functioning well. [de-identified] : The patient is alert, well-groomed, and cheerful.

## 2019-08-01 NOTE — ASSESSMENT
[FreeTextEntry1] : Patient is doing well, with excellent post-operative recovery. All surgical incisions are healing well and as expected. There is no evidence of infection or complication, and patient is progressing as expected. Post-operative wound care, activity, restrictions and precautions reinforced. Patient instructed to refrain from any heavy lifting greater than 10-15 pounds for at least 4-6 weeks post-operatively.  path discussed.Patient's questions and concerns addressed to patient's satisfaction.\par

## 2019-08-01 NOTE — HISTORY OF PRESENT ILLNESS
[de-identified] : Patient is s/p Laparotomy, sigmoid colon resection, endcolostomy, and Francisca's pouch on 07/12/2019.  The patient was found to have a perforated sigmoid diverticulitis with significant amount of contamination in the peritoneal cavity.  There was a free perforation in the sigmoid colon, leaking feces and the sigmoid colon was very indurated both proximal and distal to the perforation. Today patient offers no complaints. patient reports no fever, chills,  or  pain.   Dressing is done by his mother.

## 2019-09-05 PROBLEM — K57.80 PERFORATED DIVERTICULUM: Status: ACTIVE | Noted: 2019-07-30

## 2019-09-12 ENCOUNTER — APPOINTMENT (OUTPATIENT)
Dept: SURGERY | Facility: CLINIC | Age: 34
End: 2019-09-12
Payer: SELF-PAY

## 2019-09-12 DIAGNOSIS — K57.80 DIVERTICULITIS OF INTESTINE, PART UNSPECIFIED, WITH PERFORATION AND ABSCESS W/OUT BLEEDING: ICD-10-CM

## 2019-09-12 PROCEDURE — 99024 POSTOP FOLLOW-UP VISIT: CPT

## 2019-09-12 NOTE — PLAN
[FreeTextEntry1] : Patient was told significance of findings, options, risks and benefits were explained.  Informed consent for Colocolostomy and potential risks, benefits and alternatives (surgical options were discussed including non-surgical options or the option of no surgery) to the planned surgery were discussed in depth.  All surgical options were discussed including non-surgical treatments. Patient wishes to proceed with surgery.  We will plan for surgery on at the next available date, pending any required insurance pre-certification or pre-approval. Patient agrees to obtain any necessary pre-operative evaluations and testing prior to surgery.\par Patient advised to seek immediate medical attention with any acute change in symptoms or with the development of any new or worsening symptoms.  Patient's questions and concerns addressed to patient's satisfaction, and patient verbalized an understanding of the information discussed.\par \par

## 2019-09-12 NOTE — PHYSICAL EXAM
[Abdominal Masses] : No abdominal masses [Abdomen Tenderness] : ~T ~M No abdominal tenderness [Calm] : calm [de-identified] : The patient is alert, well-groomed, and cheerful. [de-identified] : Surgical wounds are  healing well. No signs of inflammation, infection or exudate.  Stoma functioning well.

## 2019-09-12 NOTE — ASSESSMENT
[FreeTextEntry1] : Impression: perforated diverticulum \par patient will have colocolostomy after colonoscopy .

## 2019-09-12 NOTE — HISTORY OF PRESENT ILLNESS
[de-identified] : Patient is s/p Laparotomy, sigmoid colon resection, endcolostomy, and Francisca's pouch on 07/12/2019.  The patient was found to have a perforated sigmoid diverticulitis with significant amount of contamination in the peritoneal cavity.    Today patient offers no complaints. patient reports no fever, chills,  or  pain.    Surgical wound is healing well.   no signs of  inflammation or infection.  stoma functioning well.  patient is scheduled to have colonoscopy tomorrow in [facility].  [de-identified] :  Patient reports no interval changes to her overall health status or medical history

## 2020-07-17 ENCOUNTER — EMERGENCY (EMERGENCY)
Facility: HOSPITAL | Age: 35
LOS: 1 days | Discharge: HOME | End: 2020-07-17
Attending: EMERGENCY MEDICINE
Payer: SELF-PAY

## 2020-07-17 VITALS
RESPIRATION RATE: 18 BRPM | OXYGEN SATURATION: 96 % | SYSTOLIC BLOOD PRESSURE: 124 MMHG | DIASTOLIC BLOOD PRESSURE: 71 MMHG | TEMPERATURE: 99 F | HEART RATE: 98 BPM

## 2020-07-17 VITALS
HEART RATE: 92 BPM | OXYGEN SATURATION: 97 % | TEMPERATURE: 98 F | SYSTOLIC BLOOD PRESSURE: 100 MMHG | RESPIRATION RATE: 18 BRPM | DIASTOLIC BLOOD PRESSURE: 64 MMHG

## 2020-07-17 PROCEDURE — 70450 CT HEAD/BRAIN W/O DYE: CPT

## 2020-07-17 PROCEDURE — 99285 EMERGENCY DEPT VISIT HI MDM: CPT | Mod: 25

## 2020-07-17 PROCEDURE — 99283 EMERGENCY DEPT VISIT LOW MDM: CPT | Mod: 25

## 2020-07-17 PROCEDURE — 70450 CT HEAD/BRAIN W/O DYE: CPT | Mod: 26

## 2020-07-17 PROCEDURE — 82962 GLUCOSE BLOOD TEST: CPT

## 2020-07-17 PROCEDURE — 72125 CT NECK SPINE W/O DYE: CPT

## 2020-07-17 PROCEDURE — 72125 CT NECK SPINE W/O DYE: CPT | Mod: 26
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
## 2020-07-17 NOTE — ED ADULT NURSE REASSESSMENT NOTE - NS ED NURSE REASSESS COMMENT FT1
Patient made aware of discharge status and is in agreement.  Gait to be assessed and ability to tolerate po fluids.

## 2020-07-17 NOTE — ED ADULT NURSE NOTE - NSIMPLEMENTINTERV_GEN_ALL_ED
Implemented All Fall Risk Interventions:  [place] to call system. Call bell, personal items and telephone within reach. Instruct patient to call for assistance. Room bathroom lighting operational. Non-slip footwear when patient is off stretcher. Physically safe environment: no spills, clutter or unnecessary equipment. Stretcher in lowest position, wheels locked, appropriate side rails in place. Provide visual cue, wrist band, yellow gown, etc. Monitor gait and stability. Monitor for mental status changes and reorient to person, place, and time. Review medications for side effects contributing to fall risk. Reinforce activity limits and safety measures with patient and family.

## 2020-07-17 NOTE — ED ADULT NURSE REASSESSMENT NOTE - NS ED NURSE REASSESS COMMENT FT1
Ambulatory with steady gait.  Tolerating po fluids well.  Awaiting belongings from security.  Ready for discharge.
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
## 2020-07-17 NOTE — ED PROVIDER NOTE - PHYSICAL EXAMINATION
no signs of acute head trauma.  moves all extremities to command and full ROM.  colostomy bag left lower abdomen  answers questions appropriately

## 2020-07-17 NOTE — ED PROVIDER NOTE - PATIENT PORTAL LINK FT
You can access the FollowMyHealth Patient Portal offered by [facility] by registering at the following website: [email or web address]. By joining [facility]’s FollowMyHealth portal, you will also be able to view your health information using other applications (apps) compatible with our system.

## 2020-11-08 NOTE — PROGRESS NOTE BEHAVIORAL HEALTH - NS ED [facility] MED ROS RESPIRATORY
Portable xray done at bedside.   
Report given to Adriana.   
No complaints

## 2022-05-03 ENCOUNTER — APPOINTMENT (OUTPATIENT)
Dept: ORTHOPEDIC SURGERY | Facility: CLINIC | Age: 37
End: 2022-05-03

## 2022-05-03 PROBLEM — Z00.00 ENCOUNTER FOR PREVENTIVE HEALTH EXAMINATION: Status: ACTIVE | Noted: 2022-05-03

## 2022-11-04 NOTE — ED ADULT NURSE NOTE - CHPI ED NUR SYMPTOMS POS
PAIN PAST MEDICAL HISTORY:  Reflux     Seizure     TBI (traumatic brain injury) short term memory lose   also shunt

## 2023-11-02 NOTE — ED ADULT NURSE NOTE - NS ED NURSE LEVEL OF CONSCIOUSNESS MENTAL STATUS
Abdomen flat, semi firm, non-tender. Bowel sounds + x 4 quads.   Denies NVD     Addy Ricardo RN  11/02/23 3033 Alert/Awake
